# Patient Record
Sex: MALE | Race: WHITE | NOT HISPANIC OR LATINO | URBAN - METROPOLITAN AREA
[De-identification: names, ages, dates, MRNs, and addresses within clinical notes are randomized per-mention and may not be internally consistent; named-entity substitution may affect disease eponyms.]

---

## 2018-10-06 ENCOUNTER — OFFICE VISIT (OUTPATIENT)
Dept: URGENT CARE | Facility: URGENT CARE | Age: 73
End: 2018-10-06
Payer: COMMERCIAL

## 2018-10-06 VITALS
TEMPERATURE: 96.4 F | OXYGEN SATURATION: 94 % | RESPIRATION RATE: 20 BRPM | HEIGHT: 70 IN | SYSTOLIC BLOOD PRESSURE: 130 MMHG | WEIGHT: 181.8 LBS | BODY MASS INDEX: 26.03 KG/M2 | HEART RATE: 66 BPM | DIASTOLIC BLOOD PRESSURE: 68 MMHG

## 2018-10-06 DIAGNOSIS — K04.7 DENTAL INFECTION: Primary | ICD-10-CM

## 2018-10-06 PROCEDURE — 99202 OFFICE O/P NEW SF 15 MIN: CPT | Performed by: PHYSICIAN ASSISTANT

## 2018-10-06 PROCEDURE — 99202 OFFICE O/P NEW SF 15 MIN: CPT | Mod: PO | Performed by: PHYSICIAN ASSISTANT

## 2018-10-06 RX ORDER — IBUPROFEN 200 MG
200 TABLET ORAL EVERY 6 HOURS PRN
COMMUNITY

## 2018-10-06 RX ORDER — CEPHALEXIN 500 MG/1
500 CAPSULE ORAL 3 TIMES DAILY
Qty: 21 CAPSULE | Refills: 0 | Status: SHIPPED | OUTPATIENT
Start: 2018-10-06 | End: 2018-10-13

## 2018-10-06 ASSESSMENT — PAIN SCALES - GENERAL: PAINLEVEL: 7

## 2018-10-06 NOTE — PROGRESS NOTES
"Subjective      HPI    Merlin J Weinhold is a 72 y.o. male who presents for right upper first molar pain.  Started 3-4 days ago.  Patient does have history of having a crack in this tooth.  It has not been tender until recently.  He has been using ibuprofen approximately 1200 mg daily for pain with some relief although the pain seems to be increasing today he had a harder time eating.  No fevers no nausea vomiting.      Review of Systems    As noted in HPI.      Objective   /68 (BP Location: Left arm, Patient Position: Sitting, Cuff Size: Reg)   Pulse 66   Temp (!) 35.8 °C (96.4 °F) (Temporal)   Resp 20   Ht 1.778 m (5' 10\")   Wt 82.5 kg (181 lb 12.8 oz)   SpO2 94%   BMI 26.09 kg/m²     Physical Exam   Constitutional: He appears well-developed and well-nourished. No distress.   HENT:   Head: Normocephalic and atraumatic.   Mouth/Throat: Abnormal dentition:  Multiple fractured and decayed teeth patient does have a partial for the upper but does not have it and at this point in time.  There is crack noted and swelling and erythema of the gumline around the left upper molar- first.   Eyes: Pupils are equal, round, and reactive to light. EOM are normal.   Cardiovascular: Normal rate.    Pulmonary/Chest: Effort normal and breath sounds normal.   Nursing note and vitals reviewed.      No results found for this or any previous visit (from the past 4 hour(s)).          Assessment/Plan   Diagnoses and all orders for this visit:    Dental infection  -     cephalexin (KEFLEX) 500 mg capsule; Take 1 capsule (500 mg total) by mouth 3 (three) times a day for 7 days.        Discussion:   Plan we will have him do Keflex 500 mg 3 times daily times 7 days he does have a dental appointment already for follow-up.  He may do Tylenol 650 mg and ibuprofen 600 mg 3 times daily to 4 times daily ice or heat as needed for pain.       PA Welch  "

## 2022-03-24 ENCOUNTER — MEDICAL CORRESPONDENCE (OUTPATIENT)
Dept: HEALTH INFORMATION MANAGEMENT | Facility: CLINIC | Age: 77
End: 2022-03-24
Payer: COMMERCIAL

## 2022-03-30 ENCOUNTER — TRANSCRIBE ORDERS (OUTPATIENT)
Dept: MULTI SPECIALTY CLINIC | Facility: CLINIC | Age: 77
End: 2022-03-30
Payer: COMMERCIAL

## 2022-03-30 DIAGNOSIS — E46 PROTEIN-CALORIE MALNUTRITION, UNSPECIFIED SEVERITY (H): ICD-10-CM

## 2022-03-30 DIAGNOSIS — L89.152 PRESSURE INJURY OF SACRAL REGION, STAGE 2 (H): ICD-10-CM

## 2022-03-30 DIAGNOSIS — D50.9 IRON DEFICIENCY ANEMIA, UNSPECIFIED IRON DEFICIENCY ANEMIA TYPE: ICD-10-CM

## 2022-03-30 DIAGNOSIS — M25.512 CHRONIC PAIN OF BOTH SHOULDERS: ICD-10-CM

## 2022-03-30 DIAGNOSIS — G89.29 CHRONIC PAIN OF BOTH KNEES: ICD-10-CM

## 2022-03-30 DIAGNOSIS — M25.562 CHRONIC PAIN OF BOTH KNEES: ICD-10-CM

## 2022-03-30 DIAGNOSIS — G89.29 CHRONIC PAIN OF BOTH SHOULDERS: ICD-10-CM

## 2022-03-30 DIAGNOSIS — M25.511 CHRONIC PAIN OF BOTH SHOULDERS: ICD-10-CM

## 2022-03-30 DIAGNOSIS — M79.605 BILATERAL LEG PAIN: ICD-10-CM

## 2022-03-30 DIAGNOSIS — M25.561 CHRONIC PAIN OF BOTH KNEES: ICD-10-CM

## 2022-03-30 DIAGNOSIS — R79.89 ELEVATED FERRITIN: ICD-10-CM

## 2022-03-30 DIAGNOSIS — M79.604 BILATERAL LEG PAIN: ICD-10-CM

## 2022-03-31 ENCOUNTER — TELEPHONE (OUTPATIENT)
Dept: RHEUMATOLOGY | Facility: CLINIC | Age: 77
End: 2022-03-31
Payer: COMMERCIAL

## 2022-03-31 NOTE — TELEPHONE ENCOUNTER
[facility] Call Center    Phone Message    May a detailed message be left on voicemail: no     Reason for Call: Appointment Intake    Referring Provider Name: Cristy Connolly MD from [facility]    Diagnosis and/or Symptoms: Chronic pain of both shoulders [M25.511, G89.29, M25.512]  Protein-calorie malnutrition, unspecified severity (H) [E46]  Chronic pain of both knees [M25.561, M25.562, G89.29]  Pressure injury of sacral region, stage 2 (H) [L89.152]  Iron deficiency anemia, unspecified iron deficiency anemia type [D50.9]  Bilateral leg pain [M79.604, M79.605]  Elevated ferritin [R79.89]    Urgent: 3-5 Days    Pt's spouse states they are looking for a second opinion and was recommended to be seen at the [facility] providers.    No records was received is the referral but writer had ask Pt's spouse to reach back out to the referring provider to resend records and labs to [facility] Fax: [phone] and Rheum Scheduling Fax.    Please review and advise if okay to be seen by [facility] Rheumatologist.      Action Taken: Message routed to:  [facility] ([facility]): Adult Rheumatology

## 2022-03-31 NOTE — TELEPHONE ENCOUNTER
This referral does not meet the criteria for an appointment as this is not a diagnosis that the Adult Rheumatology evaluates/manages/treats per our protocol. We encourage the patient to schedule with a community Rheumatology clinic such as [facility] or other community Rheumatology provider.     Routing to the referral team.    Kimber Adames CMA   3/31/2022 12:38 PM

## 2022-04-04 NOTE — TELEPHONE ENCOUNTER
Call received from Dr. Cristy Connolly from [facility]. She was initially requesting a call back from Dr. Alicia specifically, but otherwise would like a call back from Dr. Iniguez (since that is who pt is currently scheduled with).     She is extremely concerned about this patient needing to wait until September to be seen (when he is currently scheduled for), and wanted to provide some additional information.   She reports that pt has lost #55 pounds since approximately the beginning of January, he has several pressure sores (X 7) that are causing him extreme pain and limiting his ability to either sit or stand. He is extremely fatigued, has no appetite, and is overall declining rapidly.   He also has several abnormal rheumatology-related lab results (Anti SSA, Anti chromatin, Anti DNA antibody, Kappa freelight, IGA, etc).   He previously met with a rheumatologist (3/14/22 at [facility]) who did not feel that the diagnoses of either polymalgia rheumatica or Raynaud's were entirely appropriate, and instead diagnosed him with a connective tissue disorder and started him on hydroxychloroquine.     When pt's chart was initially reviewed for him to be seen at the [facility], he was denied, partly because there were no additional records sent with his referral.   However, all of his lab results and office visit notes (from his PCP, rheumatologist, and oncologist) are all available and visible in Care Everywhere.   Dr. Connolly would like those results taken into consideration, and is hoping this patient can be fit in at a much earlier date. She is concerned that with his #55 pound weight loss in under 4 months, that he may not still be around in September if something is not done.     Routed to RHEUMATOLOGY SUPPORT POOL, [facility] RHEUMATOLOGY ADULT [facility]

## 2022-04-05 NOTE — TELEPHONE ENCOUNTER
Called and spoke with pt's wife regarding Dr. Arreguin's request for pt to be seen on Monday.  They will come on Monday at 7:30 am.      Pavithra Dhillon RN  Rheumatology Clinic

## 2022-04-11 ENCOUNTER — PRE VISIT (OUTPATIENT)
Dept: RHEUMATOLOGY | Facility: CLINIC | Age: 77
End: 2022-04-11
Payer: COMMERCIAL

## 2022-04-11 ENCOUNTER — OFFICE VISIT (OUTPATIENT)
Dept: RHEUMATOLOGY | Facility: CLINIC | Age: 77
End: 2022-04-11
Attending: INTERNAL MEDICINE
Payer: COMMERCIAL

## 2022-04-11 VITALS
DIASTOLIC BLOOD PRESSURE: 64 MMHG | WEIGHT: 130.7 LBS | TEMPERATURE: 97.9 F | HEART RATE: 66 BPM | SYSTOLIC BLOOD PRESSURE: 104 MMHG | RESPIRATION RATE: 16 BRPM | OXYGEN SATURATION: 100 %

## 2022-04-11 DIAGNOSIS — G89.29 CHRONIC PAIN OF BOTH SHOULDERS: ICD-10-CM

## 2022-04-11 DIAGNOSIS — M79.604 BILATERAL LEG PAIN: ICD-10-CM

## 2022-04-11 DIAGNOSIS — Z11.59 ENCOUNTER FOR SCREENING FOR OTHER VIRAL DISEASES: ICD-10-CM

## 2022-04-11 DIAGNOSIS — M25.511 CHRONIC PAIN OF BOTH SHOULDERS: ICD-10-CM

## 2022-04-11 DIAGNOSIS — M25.562 CHRONIC PAIN OF BOTH KNEES: ICD-10-CM

## 2022-04-11 DIAGNOSIS — M25.512 CHRONIC PAIN OF BOTH SHOULDERS: ICD-10-CM

## 2022-04-11 DIAGNOSIS — L89.152 PRESSURE INJURY OF SACRAL REGION, STAGE 2 (H): ICD-10-CM

## 2022-04-11 DIAGNOSIS — M79.605 BILATERAL LEG PAIN: ICD-10-CM

## 2022-04-11 DIAGNOSIS — D50.9 IRON DEFICIENCY ANEMIA, UNSPECIFIED IRON DEFICIENCY ANEMIA TYPE: ICD-10-CM

## 2022-04-11 DIAGNOSIS — G89.29 CHRONIC PAIN OF BOTH KNEES: ICD-10-CM

## 2022-04-11 DIAGNOSIS — M25.561 CHRONIC PAIN OF BOTH KNEES: ICD-10-CM

## 2022-04-11 DIAGNOSIS — E46 PROTEIN-CALORIE MALNUTRITION, UNSPECIFIED SEVERITY (H): ICD-10-CM

## 2022-04-11 DIAGNOSIS — M35.3 PMR (POLYMYALGIA RHEUMATICA) (H): Primary | ICD-10-CM

## 2022-04-11 DIAGNOSIS — R79.89 ELEVATED FERRITIN: ICD-10-CM

## 2022-04-11 PROCEDURE — 99205 OFFICE O/P NEW HI 60 MIN: CPT | Performed by: INTERNAL MEDICINE

## 2022-04-11 PROCEDURE — G0463 HOSPITAL OUTPT CLINIC VISIT: HCPCS

## 2022-04-11 RX ORDER — HYDROXYCHLOROQUINE SULFATE 200 MG/1
200 TABLET, FILM COATED ORAL 2 TIMES DAILY
COMMUNITY
Start: 2022-03-14 | End: 2023-10-05

## 2022-04-11 RX ORDER — HYDROCODONE BITARTRATE AND ACETAMINOPHEN 5; 325 MG/1; MG/1
1 TABLET ORAL EVERY 12 HOURS
COMMUNITY
Start: 2022-03-09

## 2022-04-11 RX ORDER — MULTIVIT WITH MINERALS/LUTEIN
TABLET ORAL DAILY
COMMUNITY

## 2022-04-11 RX ORDER — OMEPRAZOLE 40 MG/1
40 CAPSULE, DELAYED RELEASE ORAL 2 TIMES DAILY
COMMUNITY
Start: 2022-02-21 | End: 2024-01-19

## 2022-04-11 RX ORDER — METHOCARBAMOL 500 MG/1
250-500 TABLET, FILM COATED ORAL EVERY 6 HOURS PRN
COMMUNITY
Start: 2022-03-01 | End: 2024-01-19

## 2022-04-11 RX ORDER — FAMOTIDINE 20 MG/1
20 TABLET, FILM COATED ORAL DAILY
COMMUNITY
Start: 2022-03-14 | End: 2024-01-19

## 2022-04-11 RX ORDER — METHYLPREDNISOLONE 4 MG
2.5 TABLET, DOSE PACK ORAL DAILY
COMMUNITY
Start: 2021-09-10 | End: 2024-01-19

## 2022-04-11 ASSESSMENT — PAIN SCALES - GENERAL: PAINLEVEL: MILD PAIN (3)

## 2022-04-11 NOTE — LETTER
"4/11/2022       RE: Merlin J Weinhold  [address] [address]     Dear Colleague,    Thank you for referring your patient, Merlin J Weinhold, to the [facility]OLOG[facility] [place] [facility]. Please see a copy of my visit note below.      Outpatient Rheumatology Consultation    Name: Merlin Weinhold    MRN [id number]   Today's date: 4/11/2022         Reason for consult: Evaluation and treatment of PMR   Requesting physician: Cristy Connolly MD             Assessment & Plan:   76 year old male who is followed by rheumatologist Dr Smith  for PMR on HCQ and steroids is referred for urgent referral by his PCP for evaluation with the following concerns:  1) weight loss of 55 pounds  2) pressure sores causing him pain  3) Review of his diagnosis of PMR    Overall, I agree that this patient's diagnosis is most consistent with polymyalgia rheumatica with symptoms of bilateral proximal upper and lower extremity pain/stiffness with rapid resolution of symptoms with steroids only to return with discontinuation associated with elevated ESR/CRP. He has no symptoms of GCA. Counseled him about these and what to look out for. His CRP is negative and his ESR is 33. Given his age of 76/ gender and other issues, this is likely either at or close to his baseline. PMR symptoms are currently well controlled. HCQ and steroid plan is in place by Dr Smith.    Reviewing his serologies obtained with his visit today, his GEORGINA is 1:1280 in a homogeneous pattern. SSA, SSB, smith, RNP, dsDNA, C4 are all negative/normal. His C3 is borderline low of which in this setting I am not concerned.  A prior work-up in 2/23/22 showed elevated GEORGINA/chromatin/dsDNA/SSA which I would note was drawn around the time he was admitted to the hospital for severe hyponatremia secondary to a \"cleanse\" prescribed by a naturopath- more on this below. I suspect " that his positive antibodies at that time, which can be seen in the setting of medications etc, and are negative now may have been secondary to his exposure to whatever was in that.    In regards to his weight loss, he has been having severe abdominal pain and not eating as a result. He has been taking 6-8 advil daily (since June) in addition to steroids (starting in August) without PPI, despite the recommendation from rheumatology.  It is unclear why. 10 pounds lost during COVID infection. 10 pounds lost during the above cleanse. And the other 35 pounds over the last 14 months as a result of severe post prandial pain. Had EGD with duodenal ulcer found, though thought not to explain his symptoms. Agree with plan to return to GI for consideration of repeat EGD with push enterography.     In regards to his sores causing pain- I suspect this is driven by his malnutrition secondary to his poor PO intake secondary to his abdominal pain secondary to his high NSAID intake without PPI. I recommend that his GI complaints are addressed so that he can eat/ increase calorie/protein intake and heal. Consider wound consult if you need assistance as well.    Recommendations:  1) Continue HCQ and steroids per Dr Smith for PMR  2) Return to GI as planned for consideration of repeating EGD with push enterography  3) Take PPI  4) Avoid cleanses  5) labs today- results above  6) Return to PCP for follow-up    Happy to see Merlin back in 4-5 months, though agree with all being done by Dr Smith. I think the biggest issue here leading to much of the above is the gastric issue/ severe abdominal pain/ inability to eat due to pain. Needs to start PPI. Needs to return to GI. Needs to return to PCP to ensure these things are done. His PMR is well controlled at this time.    Silvestre Arreguin MD  Rheumatology     I spent a total of 60 minutes on the date of service on chart review (PCP, labs, rheumatology from care everywhere),  "patient in person encounter, , documentation.      Subjective:   Jan/Feb, both shoulders became painful. Could lift his arms above his shoulders. Went to PT for 4 weeks, twice weekly and his ROM inproved, though pain did not resolve. He went back to PT for another 2-3 weeks. And pain still did not resolve. And plan was to continue PT at home and was doing this daily 5x/week. He then had left hip pain as well, and so had injection into left hip then bilateral shoulders as well. Though this only lasted for days to weeks. So he had second injection into right shoulder, which again, not much helpful. Then in June, had hyponatremia and was admitted to [facility]. At the end of august was started on prednisone with taper. Medrol dose pack. Did this ultimately 3 times. He had noticeable improvement with each of these. Then due to this improvement he was started on 8mg of medrol. Had noted small/mild stomach pain starting in the summer of 2021. Had small glass of wine in nov which was some worse. Then in January this started to escalate. In Junuary he had also developed significant hives (previously seen in sept when on fluconazole). He had much worsening of stomach pain during this episode of hives. Small amount of eating or tums \"took the edge off\" of his stomach pain. Appetite was much reduced during this period. Pain was so severe that evening that he got up to take advil and had a fall (hit right shoulder/head). Was seen by PCP/given pepcid. Had CT head without bleed.     In June, was taking 6-8 advil per day. From June to Feb was taking 6-8 advil per day. This was with his steroid that was started in august.      Duodenal Ulcer \"benign appearing, but it is a bit unual in that it is completely circumferential with only 2mm of witdt and mild associated narrowing of the lumen\".     Has lost about 55 pounds from Jan 2021 to today. 10 with COVID. 10 with cleanse with natropath. It was on day 8 of this that " he ended up in the hospital due to hyponatremia.     No HA. No vision change. No scalp tenderness. No jaw claudication symptoms. No dry cough. Has chills. No fevers/ night sweats. No epistaxis/hemoptysis. Has had canker sores on and off for years (seconary to peanuts). No blood in stool. No chest pain/shortness of breath. Abdominal symptoms above. No red/hot/swollen joints. No new rash. Has ulcers from pressure sores. Slowly healing. These are improving slowly. No symptoms consistent with dactylitis. No symptoms consistent with sclerodactyy.     Past Medical History  PVD  Hearing loss  Osteoarthritis in hands    Past Surgical History  Appenix  Cyst removal    Medications  No current outpatient medications on file.     No current facility-administered medications for this visit.     2.5 tabs. Decreasing by half every 2 weeks.   May 10th for liver  Oberto June 14th    Allergies   Not on File    Family History  Strong family history of CA (lung or breast in 4 of 6 of his immediate family)    Social History  Prior smoker, quit 49. No ETOH since nov. No hx of drug use now or in the past.      Objective:   /64 (BP Location: Right arm, Patient Position: Sitting, Cuff Size: Adult Regular)   Pulse 66   Temp 97.9  F (36.6  C) (Oral)   Resp 16   Wt 59.3 kg (130 lb 11.2 oz)   SpO2 100%   Physical exam:  GEN: sitting up unassisted, NAD, wife present with him, looks fatigued  HEENT: No facial rash, sclera clear, no oral or nasal ulcers, no inflammatory nasal bridge/external ear changes, good saliva pool, looks   CV: RRR, no m/r/g, mild hypotension  Pulm: CTAB no crackles wheezing ronchi  Abdomen: soft, non tender even to deep palpation, not distended, no masses  Extremities: Has slow but ultimately full active and passive ROM of bilateral shoulders/elbows, wrists. Can make full fist bilaterally. Knees/ankles/MTPs unremarkable. No synovitis of these joints. No dactylitis. No digital pitting. No nail changes. No  sclerodactyly  Skin: No acute cutaneous changes     Labs:  3/25/2022  Serum electrophoresis no paraprotein identified    3/22/2022  Ferritin 563  Creatinine 0.62  AST 24  ALT 20  CRP 4  ESR 21    3/12/2022  WBC 4.3  Hemoglobin 9.5  Platelet 264    2/23/2022  Double-stranded DNA 38  SSA 1.3  Antichromatin antibody greater than 8  RNP negative  M[place]onado negative  SCL 70 -  SSB negative  Billie 1 -  Anticentromere negative  Hep C antibody negative    8/25/2021  Lyme IgG and IgM negative  CK normal at 40  CCP antibody negative    Imaging:  EXAM: CT CHEST WITH CONTRAST; CT ABDOMEN AND PELVIS WITH   CONTRAST     CLINICAL INFORMATION: Weight loss, abdominal pain     TECHNICAL INFORMATION:  The patient was given 3 cups of   water containing a total of 50 mL Omnipaque 300 to drink   prior to the exam.  After IV injection of 100 mL of   Omnipaque 300, axial sections were obtained from the   thoracic inlet through the symphysis pubis.  Reformations   were obtained in the coronal and sagittal planes.  No   immediate complications were seen.       COMPARISON: Ultrasound 8/26/2021     INTERPRETATION:     Chest:     The lungs are clear.  No suspicious pulmonary nodules or   masses.     Heart size is normal; aortic and coronary atherosclerosis   noted.  No pericardial or pleural effusion.     No intrathoracic lymphadenopathy by size criteria.     Abdomen:     The liver, bile ducts, pancreas, spleen, adrenal glands, and   kidneys are within normal limits.  There is cholelithiasis   without evidence of cholecystitis.  Visualized intestines   are unremarkable.     No abdominopelvic lymphadenopathy by size criteria.   Negative for ascites.  There is atherosclerosis of the aorta   and its branches without aneurysm.     Pelvis:     The prostate measures 4.1 x 5.0 cm.     Urinary bladder is partially collapsed but grossly normal.     Musculoskeletal:     No acute or suspicious bony abnormality.     CONCLUSION:     1. No CT findings to explain  the patient's symptoms.   2. Prostatic enlargement.

## 2022-04-11 NOTE — TELEPHONE ENCOUNTER
NOTES Status Details   OFFICE NOTE from referring provider Received 03.24.2022 Cristy Connolly MD   MEDICATION LIST Care Everywhere    LABS (Any and all labs)      Care Everywhere

## 2022-04-11 NOTE — LETTER
"4/11/2022      RE: Merlin J Weinhold  [address] [facility] [address]         Outpatient Rheumatology Consultation    Name: Merlin Weinhold    MRN [id number]   Today's date: 4/11/2022         Reason for consult: Evaluation and treatment of PMR   Requesting physician: Cristy Connolly MD             Assessment & Plan:   76 year old male who is followed by rheumatologist Dr Smith  for PMR on HCQ and steroids is referred for urgent referral by his PCP for evaluation with the following concerns:  1) weight loss of 55 pounds  2) pressure sores causing him pain  3) Review of his diagnosis of PMR    Overall, I agree that this patient's diagnosis is most consistent with polymyalgia rheumatica with symptoms of bilateral proximal upper and lower extremity pain/stiffness with rapid resolution of symptoms with steroids only to return with discontinuation associated with elevated ESR/CRP. He has no symptoms of GCA. Counseled him about these and what to look out for. His CRP is negative and his ESR is 33. Given his age of 76/ gender and other issues, this is likely either at or close to his baseline. PMR symptoms are currently well controlled. HCQ and steroid plan is in place by Dr Smith.    Reviewing his serologies obtained with his visit today, his GEORGINA is 1:1280 in a homogeneous pattern. SSA, SSB, smith, RNP, dsDNA, C4 are all negative/normal. His C3 is borderline low of which in this setting I am not concerned.  A prior work-up in 2/23/22 showed elevated GEORGINA/chromatin/dsDNA/SSA which I would note was drawn around the time he was admitted to the hospital for severe hyponatremia secondary to a \"cleanse\" prescribed by a naturopath- more on this below. I suspect that his positive antibodies at that time, which can be seen in the setting of medications etc, and are negative now may have been secondary to his exposure to whatever was in that.    In regards to his weight loss, he has been having " severe abdominal pain and not eating as a result. He has been taking 6-8 advil daily (since June) in addition to steroids (starting in August) without PPI, despite the recommendation from rheumatology.  It is unclear why. 10 pounds lost during COVID infection. 10 pounds lost during the above cleanse. And the other 35 pounds over the last 14 months as a result of severe post prandial pain. Had EGD with duodenal ulcer found, though thought not to explain his symptoms. Agree with plan to return to GI for consideration of repeat EGD with push enterography.     In regards to his sores causing pain- I suspect this is driven by his malnutrition secondary to his poor PO intake secondary to his abdominal pain secondary to his high NSAID intake without PPI. I recommend that his GI complaints are addressed so that he can eat/ increase calorie/protein intake and heal. Consider wound consult if you need assistance as well.    Recommendations:  1) Continue HCQ and steroids per Dr Smith for PMR  2) Return to GI as planned for consideration of repeating EGD with push enterography  3) Take PPI  4) Avoid cleanses  5) labs today- results above  6) Return to PCP for follow-up    Happy to see Merlin back in 4-5 months, though agree with all being done by Dr Smith. I think the biggest issue here leading to much of the above is the gastric issue/ severe abdominal pain/ inability to eat due to pain. Needs to start PPI. Needs to return to GI. Needs to return to PCP to ensure these things are done. His PMR is well controlled at this time.    Silvestre Arreguin MD  Rheumatology     I spent a total of 60 minutes on the date of service on chart review (PCP, labs, rheumatology from care everywhere), patient in person encounter, , documentation.      Subjective:   Jan/Feb, both shoulders became painful. Could lift his arms above his shoulders. Went to PT for 4 weeks, twice weekly and his ROM inproved, though pain did not  "resolve. He went back to PT for another 2-3 weeks. And pain still did not resolve. And plan was to continue PT at home and was doing this daily 5x/week. He then had left hip pain as well, and so had injection into left hip then bilateral shoulders as well. Though this only lasted for days to weeks. So he had second injection into right shoulder, which again, not much helpful. Then in June, had hyponatremia and was admitted to [facility]. At the end of august was started on prednisone with taper. Medrol dose pack. Did this ultimately 3 times. He had noticeable improvement with each of these. Then due to this improvement he was started on 8mg of medrol. Had noted small/mild stomach pain starting in the summer of 2021. Had small glass of wine in nov which was some worse. Then in January this started to escalate. In Junuary he had also developed significant hives (previously seen in sept when on fluconazole). He had much worsening of stomach pain during this episode of hives. Small amount of eating or tums \"took the edge off\" of his stomach pain. Appetite was much reduced during this period. Pain was so severe that evening that he got up to take advil and had a fall (hit right shoulder/head). Was seen by PCP/given pepcid. Had CT head without bleed.     In June, was taking 6-8 advil per day. From June to Feb was taking 6-8 advil per day. This was with his steroid that was started in august.      Duodenal Ulcer \"benign appearing, but it is a bit unual in that it is completely circumferential with only 2mm of witdt and mild associated narrowing of the lumen\".     Has lost about 55 pounds from Jan 2021 to today. 10 with COVID. 10 with cleanse with natropath. It was on day 8 of this that he ended up in the hospital due to hyponatremia.     No HA. No vision change. No scalp tenderness. No jaw claudication symptoms. No dry cough. Has chills. No fevers/ night sweats. No epistaxis/hemoptysis. Has had canker sores on and off " for years (seconary to peanuts). No blood in stool. No chest pain/shortness of breath. Abdominal symptoms above. No red/hot/swollen joints. No new rash. Has ulcers from pressure sores. Slowly healing. These are improving slowly. No symptoms consistent with dactylitis. No symptoms consistent with sclerodactyy.     Past Medical History  PVD  Hearing loss  Osteoarthritis in hands    Past Surgical History  Appenix  Cyst removal    Medications  No current outpatient medications on file.     No current facility-administered medications for this visit.     2.5 tabs. Decreasing by half every 2 weeks.   May 10th for liver  Oberto June 14th    Allergies   Not on File    Family History  Strong family history of CA (lung or breast in 4 of 6 of his immediate family)    Social History  Prior smoker, quit 49. No ETOH since nov. No hx of drug use now or in the past.      Objective:   /64 (BP Location: Right arm, Patient Position: Sitting, Cuff Size: Adult Regular)   Pulse 66   Temp 97.9  F (36.6  C) (Oral)   Resp 16   Wt 59.3 kg (130 lb 11.2 oz)   SpO2 100%   Physical exam:  GEN: sitting up unassisted, NAD, wife present with him, looks fatigued  HEENT: No facial rash, sclera clear, no oral or nasal ulcers, no inflammatory nasal bridge/external ear changes, good saliva pool, looks   CV: RRR, no m/r/g, mild hypotension  Pulm: CTAB no crackles wheezing ronchi  Abdomen: soft, non tender even to deep palpation, not distended, no masses  Extremities: Has slow but ultimately full active and passive ROM of bilateral shoulders/elbows, wrists. Can make full fist bilaterally. Knees/ankles/MTPs unremarkable. No synovitis of these joints. No dactylitis. No digital pitting. No nail changes. No sclerodactyly  Skin: No acute cutaneous changes     Labs:  3/25/2022  Serum electrophoresis no paraprotein identified    3/22/2022  Ferritin 563  Creatinine 0.62  AST 24  ALT 20  CRP 4  ESR 21    3/12/2022  WBC 4.3  Hemoglobin 9.5  Platelet  264    2/23/2022  Double-stranded DNA 38  SSA 1.3  Antichromatin antibody greater than 8  RNP negative  M[place]onado negative  SCL 70 -  SSB negative  Billie 1 -  Anticentromere negative  Hep C antibody negative    8/25/2021  Lyme IgG and IgM negative  CK normal at 40  CCP antibody negative    Imaging:  EXAM: CT CHEST WITH CONTRAST; CT ABDOMEN AND PELVIS WITH   CONTRAST     CLINICAL INFORMATION: Weight loss, abdominal pain     TECHNICAL INFORMATION:  The patient was given 3 cups of   water containing a total of 50 mL Omnipaque 300 to drink   prior to the exam.  After IV injection of 100 mL of   Omnipaque 300, axial sections were obtained from the   thoracic inlet through the symphysis pubis.  Reformations   were obtained in the coronal and sagittal planes.  No   immediate complications were seen.       COMPARISON: Ultrasound 8/26/2021     INTERPRETATION:     Chest:     The lungs are clear.  No suspicious pulmonary nodules or   masses.     Heart size is normal; aortic and coronary atherosclerosis   noted.  No pericardial or pleural effusion.     No intrathoracic lymphadenopathy by size criteria.     Abdomen:     The liver, bile ducts, pancreas, spleen, adrenal glands, and   kidneys are within normal limits.  There is cholelithiasis   without evidence of cholecystitis.  Visualized intestines   are unremarkable.     No abdominopelvic lymphadenopathy by size criteria.   Negative for ascites.  There is atherosclerosis of the aorta   and its branches without aneurysm.     Pelvis:     The prostate measures 4.1 x 5.0 cm.     Urinary bladder is partially collapsed but grossly normal.     Musculoskeletal:     No acute or suspicious bony abnormality.     CONCLUSION:     1. No CT findings to explain the patient's symptoms.   2. Prostatic enlargement.            Silvestre Arreguin MD

## 2022-04-11 NOTE — PROGRESS NOTES
"  Outpatient Rheumatology Consultation    Name: Merlin Weinhold    MRN [id number]   Today's date: 4/11/2022         Reason for consult: Evaluation and treatment of PMR   Requesting physician: Cristy Connolly MD             Assessment & Plan:   76 year old male who is followed by rheumatologist Dr Smith  for PMR on HCQ and steroids is referred for urgent referral by his PCP for evaluation with the following concerns:  1) weight loss of 55 pounds  2) pressure sores causing him pain  3) Review of his diagnosis of PMR    Overall, I agree that this patient's diagnosis is most consistent with polymyalgia rheumatica with symptoms of bilateral proximal upper and lower extremity pain/stiffness with rapid resolution of symptoms with steroids only to return with discontinuation associated with elevated ESR/CRP. He has no symptoms of GCA. Counseled him about these and what to look out for. His CRP is negative and his ESR is 33. Given his age of 76/ gender and other issues, this is likely either at or close to his baseline. PMR symptoms are currently well controlled. HCQ and steroid plan is in place by Dr Smith.    Reviewing his serologies obtained with his visit today, his GEORGINA is 1:1280 in a homogeneous pattern. SSA, SSB, smith, RNP, dsDNA, C4 are all negative/normal. His C3 is borderline low of which in this setting I am not concerned.  A prior work-up in 2/23/22 showed elevated GEORIGNA/chromatin/dsDNA/SSA which I would note was drawn around the time he was admitted to the hospital for severe hyponatremia secondary to a \"cleanse\" prescribed by a naturopath- more on this below. I suspect that his positive antibodies at that time, which can be seen in the setting of medications etc, and are negative now may have been secondary to his exposure to whatever was in that.    In regards to his weight loss, he has been having severe abdominal pain and not eating as a result. He has been taking 6-8 advil daily (since June) " in addition to steroids (starting in August) without PPI, despite the recommendation from rheumatology.  It is unclear why. 10 pounds lost during COVID infection. 10 pounds lost during the above cleanse. And the other 35 pounds over the last 14 months as a result of severe post prandial pain. Had EGD with duodenal ulcer found, though thought not to explain his symptoms. Agree with plan to return to GI for consideration of repeat EGD with push enterography.     In regards to his sores causing pain- I suspect this is driven by his malnutrition secondary to his poor PO intake secondary to his abdominal pain secondary to his high NSAID intake without PPI. I recommend that his GI complaints are addressed so that he can eat/ increase calorie/protein intake and heal. Consider wound consult if you need assistance as well.    Recommendations:  1) Continue HCQ and steroids per Dr Smith for PMR  2) Return to GI as planned for consideration of repeating EGD with push enterography  3) Take PPI  4) Avoid cleanses  5) labs today- results above  6) Return to PCP for follow-up    Happy to see Merlin back in 4-5 months, though agree with all being done by Dr Smith. I think the biggest issue here leading to much of the above is the gastric issue/ severe abdominal pain/ inability to eat due to pain. Needs to start PPI. Needs to return to GI. Needs to return to PCP to ensure these things are done. His PMR is well controlled at this time.    Silvestre Arreguin MD  Rheumatology     I spent a total of 60 minutes on the date of service on chart review (PCP, labs, rheumatology from care everywhere), patient in person encounter, , documentation.      Subjective:   Jan/Feb, both shoulders became painful. Could lift his arms above his shoulders. Went to PT for 4 weeks, twice weekly and his ROM inproved, though pain did not resolve. He went back to PT for another 2-3 weeks. And pain still did not resolve. And plan was to  "continue PT at home and was doing this daily 5x/week. He then had left hip pain as well, and so had injection into left hip then bilateral shoulders as well. Though this only lasted for days to weeks. So he had second injection into right shoulder, which again, not much helpful. Then in June, had hyponatremia and was admitted to [facility]. At the end of august was started on prednisone with taper. Medrol dose pack. Did this ultimately 3 times. He had noticeable improvement with each of these. Then due to this improvement he was started on 8mg of medrol. Had noted small/mild stomach pain starting in the summer of 2021. Had small glass of wine in nov which was some worse. Then in January this started to escalate. In Junuary he had also developed significant hives (previously seen in sept when on fluconazole). He had much worsening of stomach pain during this episode of hives. Small amount of eating or tums \"took the edge off\" of his stomach pain. Appetite was much reduced during this period. Pain was so severe that evening that he got up to take advil and had a fall (hit right shoulder/head). Was seen by PCP/given pepcid. Had CT head without bleed.     In June, was taking 6-8 advil per day. From June to Feb was taking 6-8 advil per day. This was with his steroid that was started in august.      Duodenal Ulcer \"benign appearing, but it is a bit unual in that it is completely circumferential with only 2mm of witdt and mild associated narrowing of the lumen\".     Has lost about 55 pounds from Jan 2021 to today. 10 with COVID. 10 with cleanse with natropath. It was on day 8 of this that he ended up in the hospital due to hyponatremia.     No HA. No vision change. No scalp tenderness. No jaw claudication symptoms. No dry cough. Has chills. No fevers/ night sweats. No epistaxis/hemoptysis. Has had canker sores on and off for years (seconary to peanuts). No blood in stool. No chest pain/shortness of breath. Abdominal " symptoms above. No red/hot/swollen joints. No new rash. Has ulcers from pressure sores. Slowly healing. These are improving slowly. No symptoms consistent with dactylitis. No symptoms consistent with sclerodactyy.     Past Medical History  PVD  Hearing loss  Osteoarthritis in hands    Past Surgical History  Appenix  Cyst removal    Medications  No current outpatient medications on file.     No current facility-administered medications for this visit.     2.5 tabs. Decreasing by half every 2 weeks.   May 10th for liver  Oberto June 14th    Allergies   Not on File    Family History  Strong family history of CA (lung or breast in 4 of 6 of his immediate family)    Social History  Prior smoker, quit 49. No ETOH since nov. No hx of drug use now or in the past.      Objective:   /64 (BP Location: Right arm, Patient Position: Sitting, Cuff Size: Adult Regular)   Pulse 66   Temp 97.9  F (36.6  C) (Oral)   Resp 16   Wt 59.3 kg (130 lb 11.2 oz)   SpO2 100%   Physical exam:  GEN: sitting up unassisted, NAD, wife present with him, looks fatigued  HEENT: No facial rash, sclera clear, no oral or nasal ulcers, no inflammatory nasal bridge/external ear changes, good saliva pool, looks   CV: RRR, no m/r/g, mild hypotension  Pulm: CTAB no crackles wheezing ronchi  Abdomen: soft, non tender even to deep palpation, not distended, no masses  Extremities: Has slow but ultimately full active and passive ROM of bilateral shoulders/elbows, wrists. Can make full fist bilaterally. Knees/ankles/MTPs unremarkable. No synovitis of these joints. No dactylitis. No digital pitting. No nail changes. No sclerodactyly  Skin: No acute cutaneous changes     Labs:  3/25/2022  Serum electrophoresis no paraprotein identified    3/22/2022  Ferritin 563  Creatinine 0.62  AST 24  ALT 20  CRP 4  ESR 21    3/12/2022  WBC 4.3  Hemoglobin 9.5  Platelet 264    2/23/2022  Double-stranded DNA 38  SSA 1.3  Antichromatin antibody greater than 8  RNP  negative  M[place]onado negative  SCL 70 -  SSB negative  Billie 1 -  Anticentromere negative  Hep C antibody negative    8/25/2021  Lyme IgG and IgM negative  CK normal at 40  CCP antibody negative    Imaging:  EXAM: CT CHEST WITH CONTRAST; CT ABDOMEN AND PELVIS WITH   CONTRAST     CLINICAL INFORMATION: Weight loss, abdominal pain     TECHNICAL INFORMATION:  The patient was given 3 cups of   water containing a total of 50 mL Omnipaque 300 to drink   prior to the exam.  After IV injection of 100 mL of   Omnipaque 300, axial sections were obtained from the   thoracic inlet through the symphysis pubis.  Reformations   were obtained in the coronal and sagittal planes.  No   immediate complications were seen.       COMPARISON: Ultrasound 8/26/2021     INTERPRETATION:     Chest:     The lungs are clear.  No suspicious pulmonary nodules or   masses.     Heart size is normal; aortic and coronary atherosclerosis   noted.  No pericardial or pleural effusion.     No intrathoracic lymphadenopathy by size criteria.     Abdomen:     The liver, bile ducts, pancreas, spleen, adrenal glands, and   kidneys are within normal limits.  There is cholelithiasis   without evidence of cholecystitis.  Visualized intestines   are unremarkable.     No abdominopelvic lymphadenopathy by size criteria.   Negative for ascites.  There is atherosclerosis of the aorta   and its branches without aneurysm.     Pelvis:     The prostate measures 4.1 x 5.0 cm.     Urinary bladder is partially collapsed but grossly normal.     Musculoskeletal:     No acute or suspicious bony abnormality.     CONCLUSION:     1. No CT findings to explain the patient's symptoms.   2. Prostatic enlargement.

## 2022-04-11 NOTE — NURSING NOTE
Chief Complaint   Patient presents with     Consult     Chronic pain of both shoulders and knees     Possible raynaud's    Vital signs:  Temp: 97.9  F (36.6  C) Temp src: Oral BP: 104/64 Pulse: 66   Resp: 16 SpO2: 100 %       Weight: 59.3 kg (130 lb 11.2 oz)  There is no height or weight on file to calculate BMI.        Mary Lucas, [facility]  4/11/2022 7:26 AM

## 2022-04-12 ENCOUNTER — LAB (OUTPATIENT)
Dept: LAB | Facility: CLINIC | Age: 77
End: 2022-04-12
Payer: COMMERCIAL

## 2022-04-12 DIAGNOSIS — Z11.59 ENCOUNTER FOR SCREENING FOR OTHER VIRAL DISEASES: ICD-10-CM

## 2022-04-12 DIAGNOSIS — M35.3 PMR (POLYMYALGIA RHEUMATICA) (H): ICD-10-CM

## 2022-04-12 LAB
ALBUMIN UR-MCNC: NEGATIVE MG/DL
APPEARANCE UR: CLEAR
BILIRUB UR QL STRIP: NEGATIVE
COLOR UR AUTO: YELLOW
CRP SERPL-MCNC: <2.9 MG/L (ref 0–8)
ERYTHROCYTE [SEDIMENTATION RATE] IN BLOOD BY WESTERGREN METHOD: 33 MM/HR (ref 0–20)
GLUCOSE UR STRIP-MCNC: NEGATIVE MG/DL
HGB UR QL STRIP: NEGATIVE
HYALINE CASTS: 9 /LPF
KETONES UR STRIP-MCNC: NEGATIVE MG/DL
LEUKOCYTE ESTERASE UR QL STRIP: NEGATIVE
MUCOUS THREADS #/AREA URNS LPF: PRESENT /LPF
NITRATE UR QL: NEGATIVE
PH UR STRIP: 5 [PH] (ref 5–7)
RBC URINE: 1 /HPF
SP GR UR STRIP: 1.02 (ref 1–1.03)
UROBILINOGEN UR STRIP-MCNC: NORMAL MG/DL
WBC URINE: 1 /HPF

## 2022-04-12 PROCEDURE — 86039 ANTINUCLEAR ANTIBODIES (ANA): CPT | Mod: 90 | Performed by: PATHOLOGY

## 2022-04-12 PROCEDURE — 86235 NUCLEAR ANTIGEN ANTIBODY: CPT | Mod: 90 | Performed by: PATHOLOGY

## 2022-04-12 PROCEDURE — 86140 C-REACTIVE PROTEIN: CPT | Performed by: PATHOLOGY

## 2022-04-12 PROCEDURE — 86704 HEP B CORE ANTIBODY TOTAL: CPT | Mod: 90 | Performed by: PATHOLOGY

## 2022-04-12 PROCEDURE — 81001 URINALYSIS AUTO W/SCOPE: CPT | Performed by: PATHOLOGY

## 2022-04-12 PROCEDURE — 87389 HIV-1 AG W/HIV-1&-2 AB AG IA: CPT | Mod: 90 | Performed by: PATHOLOGY

## 2022-04-12 PROCEDURE — 86706 HEP B SURFACE ANTIBODY: CPT | Mod: 90 | Performed by: PATHOLOGY

## 2022-04-12 PROCEDURE — 85652 RBC SED RATE AUTOMATED: CPT | Performed by: PATHOLOGY

## 2022-04-12 PROCEDURE — 86160 COMPLEMENT ANTIGEN: CPT | Mod: 90 | Performed by: PATHOLOGY

## 2022-04-12 PROCEDURE — 86038 ANTINUCLEAR ANTIBODIES: CPT | Mod: 90 | Performed by: PATHOLOGY

## 2022-04-12 PROCEDURE — 36415 COLL VENOUS BLD VENIPUNCTURE: CPT | Performed by: PATHOLOGY

## 2022-04-12 PROCEDURE — 86481 TB AG RESPONSE T-CELL SUSP: CPT | Mod: 90 | Performed by: PATHOLOGY

## 2022-04-12 PROCEDURE — 86225 DNA ANTIBODY NATIVE: CPT | Mod: 90 | Performed by: PATHOLOGY

## 2022-04-12 PROCEDURE — 87340 HEPATITIS B SURFACE AG IA: CPT | Mod: 90 | Performed by: PATHOLOGY

## 2022-04-12 PROCEDURE — 99000 SPECIMEN HANDLING OFFICE-LAB: CPT | Performed by: PATHOLOGY

## 2022-04-13 LAB
C3 SERPL-MCNC: 77 MG/DL (ref 81–157)
C4 SERPL-MCNC: 19 MG/DL (ref 13–39)
DSDNA AB SER-ACNC: 9.7 IU/ML
ENA SM IGG SER IA-ACNC: 1.6 U/ML
ENA SM IGG SER IA-ACNC: NEGATIVE
ENA SS-A AB SER IA-ACNC: 0.5 U/ML
ENA SS-A AB SER IA-ACNC: NEGATIVE
ENA SS-B IGG SER IA-ACNC: <0.6 U/ML
ENA SS-B IGG SER IA-ACNC: NEGATIVE
GAMMA INTERFERON BACKGROUND BLD IA-ACNC: 0.03 IU/ML
HBV CORE AB SERPL QL IA: REACTIVE
HBV SURFACE AB SERPL IA-ACNC: 36.87 M[IU]/ML
HBV SURFACE AG SERPL QL IA: NONREACTIVE
HIV 1+2 AB+HIV1 P24 AG SERPL QL IA: NONREACTIVE
M TB IFN-G BLD-IMP: NEGATIVE
M TB IFN-G CD4+ BCKGRND COR BLD-ACNC: 1.19 IU/ML
MITOGEN IGNF BCKGRD COR BLD-ACNC: -0.01 IU/ML
MITOGEN IGNF BCKGRD COR BLD-ACNC: -0.01 IU/ML
QUANTIFERON MITOGEN: 1.22 IU/ML
QUANTIFERON NIL TUBE: 0.03 IU/ML
QUANTIFERON TB1 TUBE: 0.02 IU/ML
QUANTIFERON TB2 TUBE: 0.02
U1 SNRNP IGG SER IA-ACNC: 1.2 U/ML
U1 SNRNP IGG SER IA-ACNC: NEGATIVE

## 2022-04-14 LAB
ANA PAT SER IF-IMP: ABNORMAL
ANA SER QL IF: POSITIVE
ANA TITR SER IF: ABNORMAL {TITER}

## 2022-08-11 ENCOUNTER — OFFICE VISIT (OUTPATIENT)
Dept: RHEUMATOLOGY | Facility: CLINIC | Age: 77
End: 2022-08-11
Attending: INTERNAL MEDICINE
Payer: COMMERCIAL

## 2022-08-11 ENCOUNTER — LAB (OUTPATIENT)
Dept: LAB | Facility: CLINIC | Age: 77
End: 2022-08-11
Attending: INTERNAL MEDICINE
Payer: COMMERCIAL

## 2022-08-11 VITALS
DIASTOLIC BLOOD PRESSURE: 59 MMHG | HEART RATE: 63 BPM | BODY MASS INDEX: 23.06 KG/M2 | SYSTOLIC BLOOD PRESSURE: 101 MMHG | WEIGHT: 146.9 LBS | HEIGHT: 67 IN | OXYGEN SATURATION: 93 %

## 2022-08-11 DIAGNOSIS — Z79.899 ENCOUNTER FOR LONG-TERM (CURRENT) USE OF HIGH-RISK MEDICATION: ICD-10-CM

## 2022-08-11 DIAGNOSIS — M35.3 PMR (POLYMYALGIA RHEUMATICA) (H): ICD-10-CM

## 2022-08-11 DIAGNOSIS — Z79.52 LONG TERM (CURRENT) USE OF SYSTEMIC STEROIDS: ICD-10-CM

## 2022-08-11 DIAGNOSIS — M35.3 PMR (POLYMYALGIA RHEUMATICA) (H): Primary | ICD-10-CM

## 2022-08-11 LAB
ALBUMIN SERPL-MCNC: 3.4 G/DL (ref 3.4–5)
ALP SERPL-CCNC: 65 U/L (ref 40–150)
ALT SERPL W P-5'-P-CCNC: 20 U/L (ref 0–70)
ANION GAP SERPL CALCULATED.3IONS-SCNC: 6 MMOL/L (ref 3–14)
AST SERPL W P-5'-P-CCNC: 22 U/L (ref 0–45)
BASOPHILS # BLD AUTO: 0 10E3/UL (ref 0–0.2)
BASOPHILS NFR BLD AUTO: 1 %
BILIRUB SERPL-MCNC: 0.4 MG/DL (ref 0.2–1.3)
BUN SERPL-MCNC: 14 MG/DL (ref 7–30)
CALCIUM SERPL-MCNC: 8.7 MG/DL (ref 8.5–10.1)
CHLORIDE BLD-SCNC: 101 MMOL/L (ref 94–109)
CO2 SERPL-SCNC: 31 MMOL/L (ref 20–32)
CREAT SERPL-MCNC: 0.95 MG/DL (ref 0.66–1.25)
CRP SERPL-MCNC: <2.9 MG/L (ref 0–8)
EOSINOPHIL # BLD AUTO: 0.2 10E3/UL (ref 0–0.7)
EOSINOPHIL NFR BLD AUTO: 4 %
ERYTHROCYTE [DISTWIDTH] IN BLOOD BY AUTOMATED COUNT: 12.5 % (ref 10–15)
ERYTHROCYTE [SEDIMENTATION RATE] IN BLOOD BY WESTERGREN METHOD: 19 MM/HR (ref 0–20)
GFR SERPL CREATININE-BSD FRML MDRD: 83 ML/MIN/1.73M2
GLUCOSE BLD-MCNC: 78 MG/DL (ref 70–99)
HCT VFR BLD AUTO: 35.5 % (ref 40–53)
HGB BLD-MCNC: 11.9 G/DL (ref 13.3–17.7)
IMM GRANULOCYTES # BLD: 0 10E3/UL
IMM GRANULOCYTES NFR BLD: 0 %
LYMPHOCYTES # BLD AUTO: 0.8 10E3/UL (ref 0.8–5.3)
LYMPHOCYTES NFR BLD AUTO: 14 %
MCH RBC QN AUTO: 30.5 PG (ref 26.5–33)
MCHC RBC AUTO-ENTMCNC: 33.5 G/DL (ref 31.5–36.5)
MCV RBC AUTO: 91 FL (ref 78–100)
MONOCYTES # BLD AUTO: 0.6 10E3/UL (ref 0–1.3)
MONOCYTES NFR BLD AUTO: 11 %
NEUTROPHILS # BLD AUTO: 4 10E3/UL (ref 1.6–8.3)
NEUTROPHILS NFR BLD AUTO: 70 %
NRBC # BLD AUTO: 0 10E3/UL
NRBC BLD AUTO-RTO: 0 /100
PLATELET # BLD AUTO: 220 10E3/UL (ref 150–450)
POTASSIUM BLD-SCNC: 3.9 MMOL/L (ref 3.4–5.3)
PROT SERPL-MCNC: 6.8 G/DL (ref 6.8–8.8)
RBC # BLD AUTO: 3.9 10E6/UL (ref 4.4–5.9)
SODIUM SERPL-SCNC: 138 MMOL/L (ref 133–144)
WBC # BLD AUTO: 5.6 10E3/UL (ref 4–11)

## 2022-08-11 PROCEDURE — 36415 COLL VENOUS BLD VENIPUNCTURE: CPT | Performed by: PATHOLOGY

## 2022-08-11 PROCEDURE — 99214 OFFICE O/P EST MOD 30 MIN: CPT | Performed by: INTERNAL MEDICINE

## 2022-08-11 PROCEDURE — 85652 RBC SED RATE AUTOMATED: CPT | Performed by: PATHOLOGY

## 2022-08-11 PROCEDURE — 80053 COMPREHEN METABOLIC PANEL: CPT | Performed by: PATHOLOGY

## 2022-08-11 PROCEDURE — 86140 C-REACTIVE PROTEIN: CPT | Performed by: PATHOLOGY

## 2022-08-11 PROCEDURE — 85025 COMPLETE CBC W/AUTO DIFF WBC: CPT | Performed by: PATHOLOGY

## 2022-08-11 ASSESSMENT — PAIN SCALES - GENERAL: PAINLEVEL: NO PAIN (0)

## 2022-08-11 NOTE — LETTER
Date:August 30, 2022      Patient was self referred, no letter generated. Do not send.        [facility] Health Information

## 2022-08-11 NOTE — PROGRESS NOTES
"  Outpatient Rheumatology follow-up    Name: Merlin Weinhold    MRN [id number]   Today's date: 8/11/2022         Reason for follow-up: PMR   Primary care physician: Cristy Connolly MD             Assessment & Plan:   76 year old male who co-managed by rheumatologist Dr Smith  for PMR on HCQ and steroids.    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  This is supported by his labs today which show normal ESR and CRP.  His upper back pain which comes on later in the afternoon does not sound inflammatory nor consistent with PMR. HCQ and steroid plan is in place by Dr Smith.  -Continue HCQ 200mg BID  -Continue methylprednisolone 4mg once daily with taper plan outlined by Dr Smith  -Labs today  -Follow-up in 6 months     #Positive GEORGINA: Reviewing his serologies obtained with his visit today, his GEORGINA is 1:1280 in a homogeneous pattern. SSA, SSB, smith, RNP, dsDNA, C4 are all negative/normal. His C3 is borderline low of which in this setting I am not concerned.  A prior work-up in 2/23/22 showed elevated GEORGINA/chromatin/dsDNA/SSA which I would note was drawn around the time he was admitted to the hospital for severe hyponatremia secondary to a \"cleanse\" prescribed by a naturopath- more on this below. I suspect that his positive antibodies at that time, which can be seen in the setting of medications/supplements etc, and are negative now may have been secondary to his exposure to whatever was in that.    High risk medication use: Hydroxychloroquine  -Risks and benefits of HCQ discussed again today to include rash (including severe rash/SJS/TEN), HA, GI upset, hepatoxicity, retinal toxicity, need for yearly screening OCT exam, need for CBC, CMP, ESR, CRP for routine screening drug toxicity labs among others. Patient is agreeable to continue.  -We do not have records for a baseline screening OCT exam. While prescribed by Dr" Luis, will follow-up with him on this at his next visit  -Routine screening drug toxicity labs obtained today to include CBC, CMP which did not show any evidence of drug toxicity.  We will continue routine drug toxicity screening labs every 6 months    Silvestre Arreguin MD  Rheumatology        Subjective:   Interval history 8/11/2022   has pain in the upper back in the afternoons, between 3-4pm. Lasts for the rest of the day. Takes vicodin in the afternoon another 8pm. In the AM it is gone. His current dose of steroids is 1 tablet of methylprednisolone 4mg each morning. Has been on this dose for about 10 weeks. Shortly after decreasing to that dose, he noticed that his shoulder symptoms were some worse.  Though not by much. No side effects from HCQ. No GI complaints any longer. Up about 16 pounds. No GCA symptoms. Occasional hip discomfort after a short walk. Able to get up out of a chair without any difficulty. Shoulder ROM is much improved. Overall he is functionally improved from his PMR treatment. He and his wife are pleased with him improvement during this time. No interval infections. no fevers chills night sweats.  No new rashes.  14 point review of systems collected and otherwise negative.    HPI from initial consultation 4/11/2022 Jan/Feb, both shoulders became painful. Could lift his arms above his shoulders. Went to PT for 4 weeks, twice weekly and his ROM inproved, though pain did not resolve. He went back to PT for another 2-3 weeks. And pain still did not resolve. And plan was to continue PT at home and was doing this daily 5x/week. He then had left hip pain as well, and so had injection into left hip then bilateral shoulders as well. Though this only lasted for days to weeks. So he had second injection into right shoulder, which again, not much helpful. Then in June, had hyponatremia and was admitted to [facility]. At the end of august was started on prednisone with taper. Medrol dose  "pack. Did this ultimately 3 times. He had noticeable improvement with each of these. Then due to this improvement he was started on 8mg of medrol. Had noted small/mild stomach pain starting in the summer of 2021. Had small glass of wine in nov which was some worse. Then in January this started to escalate. In Junuary he had also developed significant hives (previously seen in sept when on fluconazole). He had much worsening of stomach pain during this episode of hives. Small amount of eating or tums \"took the edge off\" of his stomach pain. Appetite was much reduced during this period. Pain was so severe that evening that he got up to take advil and had a fall (hit right shoulder/head). Was seen by PCP/given pepcid. Had CT head without bleed.     In June, was taking 6-8 advil per day. From June to Feb was taking 6-8 advil per day. This was with his steroid that was started in august.      Duodenal Ulcer \"benign appearing, but it is a bit unual in that it is completely circumferential with only 2mm of witdt and mild associated narrowing of the lumen\".     Has lost about 55 pounds from Jan 2021 to today. 10 with COVID. 10 with cleanse with natropath. It was on day 8 of this that he ended up in the hospital due to hyponatremia.     No HA. No vision change. No scalp tenderness. No jaw claudication symptoms. No dry cough. Has chills. No fevers/ night sweats. No epistaxis/hemoptysis. Has had canker sores on and off for years (seconary to peanuts). No blood in stool. No chest pain/shortness of breath. Abdominal symptoms above. No red/hot/swollen joints. No new rash. Has ulcers from pressure sores. Slowly healing. These are improving slowly. No symptoms consistent with dactylitis. No symptoms consistent with sclerodactyy.     Past Medical History  PVD  Hearing loss  Osteoarthritis in hands    Past Surgical History  Appenix  Cyst removal    Medications  Current Outpatient Medications   Medication     acetylcysteine (NAC) 600 " "MG CAPS capsule     Cholecalciferol (VITAMIN D-3) 125 MCG (5000 UT) TABS     famotidine (PEPCID) 20 MG tablet     Ferrous Sulfate (IRON PO)     FIBER PO     HYDROcodone-acetaminophen (NORCO) 5-325 MG tablet     hydroxychloroquine (PLAQUENIL) 200 MG tablet     MAGNESIUM PO     methocarbamol (ROBAXIN) 500 MG tablet     methylPREDNISolone (MEDROL DOSEPAK) 4 MG tablet therapy pack     Multiple Vitamin (MULTIVITAMIN PO)     Multiple Vitamin (STRESS FORMULA PO)     Multiple Vitamins-Minerals (IMMUNE SUPPORT PO)     Nutritional Supplements (DHEA PO)     Omega-3 Fatty Acids (OMEGA 3 PO)     omeprazole (PRILOSEC) 40 MG DR capsule     PROTEIN PO     PROTEIN PO     UNABLE TO FIND     UNABLE TO FIND     UNABLE TO FIND     UNABLE TO FIND     UNABLE TO FIND     UNABLE TO FIND     UNABLE TO FIND     UNABLE TO FIND     UNABLE TO FIND     UNABLE TO FIND     vitamin C (ASCORBIC ACID) 1000 MG TABS     No current facility-administered medications for this visit.     2.5 tabs. Decreasing by half every 2 weeks.   May 10th for liver  Oberto June 14th    Allergies     Allergies   Allergen Reactions     Fluconazole Hives, Itching and Rash     Hives like blisters without fluid arms and back and legs and chest       Family History  Strong family history of CA (lung or breast in 4 of 6 of his immediate family)    Social History  Prior smoker, quit 49. No ETOH since nov. No hx of drug use now or in the past.      Objective:   /59 (BP Location: Right arm, Patient Position: Chair, Cuff Size: Adult Regular)   Pulse 63   Ht 1.702 m (5' 7\")   Wt 66.6 kg (146 lb 14.4 oz)   SpO2 93%   BMI 23.01 kg/m    Physical exam:  GEN: sitting up unassisted, NAD, wife present with him  HEENT: No facial rash, sclera clear, no oral or nasal ulcers, no inflammatory nasal bridge/external ear changes, good saliva pool, looks   CV: RRR, no m/r/g, mild hypotension  Pulm: CTAB no crackles wheezing ronchi  Abdomen: soft, non tender, not distended, no " masses  Extremities: full active and passive ROM of bilateral shoulders/elbows, wrists. Can make full fist bilaterally. Knees/ankles/MTPs unremarkable. No synovitis of these joints. No dactylitis. No digital pitting. No nail changes. No sclerodactyly. Able to stand unassisted without the use of his arms from a seated position.  Skin: No acute cutaneous changes     Labs:  WBC Count   Date Value Ref Range Status   08/11/2022 5.6 4.0 - 11.0 10e3/uL Final     Hemoglobin   Date Value Ref Range Status   08/11/2022 11.9 (L) 13.3 - 17.7 g/dL Final     Platelet Count   Date Value Ref Range Status   08/11/2022 220 150 - 450 10e3/uL Final     Creatinine   Date Value Ref Range Status   08/11/2022 0.95 0.66 - 1.25 mg/dL Final     Lab Results   Component Value Date    ALKPHOS 65 08/11/2022     AST   Date Value Ref Range Status   08/11/2022 22 0 - 45 U/L Final     Lab Results   Component Value Date    ALT 20 08/11/2022     Erythrocyte Sedimentation Rate   Date Value Ref Range Status   08/11/2022 19 0 - 20 mm/hr Final     CRP Inflammation   Date Value Ref Range Status   08/11/2022 <2.9 0.0 - 8.0 mg/L Final     UA RESULTS:  Recent Labs   Lab Test 04/12/22  1115   COLOR Yellow   APPEARANCE Clear   URINEGLC Negative   URINEBILI Negative   URINEKETONE Negative   SG 1.023   UBLD Negative   URINEPH 5.0   PROTEIN Negative   NITRITE Negative   LEUKEST Negative   RBCU 1   WBCU 1      GEORGINA pattern 1   Date Value Ref Range Status   04/12/2022 Homogeneous  Final     DNA (ds) Antibody   Date Value Ref Range Status   04/12/2022 9.7 <10.0 IU/mL Final     Comment:     Negative     RNP Antibody IgG   Date Value Ref Range Status   04/12/2022 Negative Negative Final     3/25/2022  Serum electrophoresis no paraprotein identified    3/22/2022  Ferritin 563  Creatinine 0.62  AST 24  ALT 20  CRP 4  ESR 21    3/12/2022  WBC 4.3  Hemoglobin 9.5  Platelet 264    2/23/2022  Double-stranded DNA 38  SSA 1.3  Antichromatin antibody greater than 8  RNP  negative  [place] negative  SCL 70 -  SSB negative  Billie 1 -  Anticentromere negative  Hep C antibody negative    8/25/2021  Lyme IgG and IgM negative  CK normal at 40  CCP antibody negative    Imaging:  EXAM: CT CHEST WITH CONTRAST; CT ABDOMEN AND PELVIS WITH   CONTRAST     CLINICAL INFORMATION: Weight loss, abdominal pain     TECHNICAL INFORMATION:  The patient was given 3 cups of   water containing a total of 50 mL Omnipaque 300 to drink   prior to the exam.  After IV injection of 100 mL of   Omnipaque 300, axial sections were obtained from the   thoracic inlet through the symphysis pubis.  Reformations   were obtained in the coronal and sagittal planes.  No   immediate complications were seen.       COMPARISON: Ultrasound 8/26/2021     INTERPRETATION:     Chest:     The lungs are clear.  No suspicious pulmonary nodules or   masses.     Heart size is normal; aortic and coronary atherosclerosis   noted.  No pericardial or pleural effusion.     No intrathoracic lymphadenopathy by size criteria.     Abdomen:     The liver, bile ducts, pancreas, spleen, adrenal glands, and   kidneys are within normal limits.  There is cholelithiasis   without evidence of cholecystitis.  Visualized intestines   are unremarkable.     No abdominopelvic lymphadenopathy by size criteria.   Negative for ascites.  There is atherosclerosis of the aorta   and its branches without aneurysm.     Pelvis:     The prostate measures 4.1 x 5.0 cm.     Urinary bladder is partially collapsed but grossly normal.     Musculoskeletal:     No acute or suspicious bony abnormality.     CONCLUSION:     1. No CT findings to explain the patient's symptoms.   2. Prostatic enlargement.

## 2022-08-11 NOTE — NURSING NOTE
"Chief Complaint   Patient presents with     RECHECK     4 month return visit.     /59 (BP Location: Right arm, Patient Position: Chair, Cuff Size: Adult Regular)   Pulse 63   Ht 1.702 m (5' 7\")   Wt 66.6 kg (146 lb 14.4 oz)   SpO2 93%   BMI 23.01 kg/m    Mary Jackson MA on 8/11/2022 at 8:25 AM    "

## 2022-09-11 ENCOUNTER — HEALTH MAINTENANCE LETTER (OUTPATIENT)
Age: 77
End: 2022-09-11

## 2023-02-01 ENCOUNTER — OFFICE VISIT (OUTPATIENT)
Dept: RHEUMATOLOGY | Facility: CLINIC | Age: 78
End: 2023-02-01
Attending: INTERNAL MEDICINE
Payer: COMMERCIAL

## 2023-02-01 ENCOUNTER — LAB (OUTPATIENT)
Dept: LAB | Facility: CLINIC | Age: 78
End: 2023-02-01
Payer: COMMERCIAL

## 2023-02-01 VITALS
SYSTOLIC BLOOD PRESSURE: 110 MMHG | HEART RATE: 75 BPM | DIASTOLIC BLOOD PRESSURE: 71 MMHG | BODY MASS INDEX: 23.89 KG/M2 | HEIGHT: 67 IN | WEIGHT: 152.2 LBS

## 2023-02-01 DIAGNOSIS — Z79.899 ENCOUNTER FOR LONG-TERM (CURRENT) USE OF HIGH-RISK MEDICATION: ICD-10-CM

## 2023-02-01 DIAGNOSIS — M35.3 PMR (POLYMYALGIA RHEUMATICA) (H): ICD-10-CM

## 2023-02-01 DIAGNOSIS — G89.29 CHRONIC BILATERAL THORACIC BACK PAIN: ICD-10-CM

## 2023-02-01 DIAGNOSIS — M35.3 PMR (POLYMYALGIA RHEUMATICA) (H): Primary | ICD-10-CM

## 2023-02-01 DIAGNOSIS — M54.6 CHRONIC BILATERAL THORACIC BACK PAIN: ICD-10-CM

## 2023-02-01 LAB
ALBUMIN SERPL BCG-MCNC: 4.1 G/DL (ref 3.5–5.2)
ALT SERPL W P-5'-P-CCNC: 13 U/L (ref 10–50)
AST SERPL W P-5'-P-CCNC: 26 U/L (ref 10–50)
BASOPHILS # BLD AUTO: 0 10E3/UL (ref 0–0.2)
BASOPHILS NFR BLD AUTO: 1 %
CREAT SERPL-MCNC: 1.01 MG/DL (ref 0.67–1.17)
CRP SERPL-MCNC: <3 MG/L
EOSINOPHIL # BLD AUTO: 0.2 10E3/UL (ref 0–0.7)
EOSINOPHIL NFR BLD AUTO: 4 %
ERYTHROCYTE [DISTWIDTH] IN BLOOD BY AUTOMATED COUNT: 12.4 % (ref 10–15)
ERYTHROCYTE [SEDIMENTATION RATE] IN BLOOD BY WESTERGREN METHOD: 16 MM/HR (ref 0–20)
GFR SERPL CREATININE-BSD FRML MDRD: 77 ML/MIN/1.73M2
HCT VFR BLD AUTO: 37.7 % (ref 40–53)
HGB BLD-MCNC: 12.3 G/DL (ref 13.3–17.7)
IMM GRANULOCYTES # BLD: 0 10E3/UL
IMM GRANULOCYTES NFR BLD: 0 %
LYMPHOCYTES # BLD AUTO: 1.1 10E3/UL (ref 0.8–5.3)
LYMPHOCYTES NFR BLD AUTO: 23 %
MCH RBC QN AUTO: 30 PG (ref 26.5–33)
MCHC RBC AUTO-ENTMCNC: 32.6 G/DL (ref 31.5–36.5)
MCV RBC AUTO: 92 FL (ref 78–100)
MONOCYTES # BLD AUTO: 0.5 10E3/UL (ref 0–1.3)
MONOCYTES NFR BLD AUTO: 10 %
NEUTROPHILS # BLD AUTO: 3 10E3/UL (ref 1.6–8.3)
NEUTROPHILS NFR BLD AUTO: 62 %
NRBC # BLD AUTO: 0 10E3/UL
NRBC BLD AUTO-RTO: 0 /100
PLATELET # BLD AUTO: 249 10E3/UL (ref 150–450)
RBC # BLD AUTO: 4.1 10E6/UL (ref 4.4–5.9)
WBC # BLD AUTO: 4.9 10E3/UL (ref 4–11)

## 2023-02-01 PROCEDURE — 36415 COLL VENOUS BLD VENIPUNCTURE: CPT | Performed by: PATHOLOGY

## 2023-02-01 PROCEDURE — 84460 ALANINE AMINO (ALT) (SGPT): CPT | Performed by: PATHOLOGY

## 2023-02-01 PROCEDURE — 84450 TRANSFERASE (AST) (SGOT): CPT | Performed by: PATHOLOGY

## 2023-02-01 PROCEDURE — G0463 HOSPITAL OUTPT CLINIC VISIT: HCPCS | Performed by: INTERNAL MEDICINE

## 2023-02-01 PROCEDURE — 99214 OFFICE O/P EST MOD 30 MIN: CPT | Performed by: INTERNAL MEDICINE

## 2023-02-01 PROCEDURE — 82565 ASSAY OF CREATININE: CPT | Performed by: PATHOLOGY

## 2023-02-01 PROCEDURE — 85025 COMPLETE CBC W/AUTO DIFF WBC: CPT | Performed by: PATHOLOGY

## 2023-02-01 PROCEDURE — 82040 ASSAY OF SERUM ALBUMIN: CPT | Performed by: PATHOLOGY

## 2023-02-01 PROCEDURE — 86140 C-REACTIVE PROTEIN: CPT | Performed by: PATHOLOGY

## 2023-02-01 PROCEDURE — 85652 RBC SED RATE AUTOMATED: CPT | Performed by: PATHOLOGY

## 2023-02-01 ASSESSMENT — PAIN SCALES - GENERAL: PAINLEVEL: MILD PAIN (3)

## 2023-02-01 NOTE — PATIENT INSTRUCTIONS
1) labs today  2) send my chart message with name/location of your eye clinic. We will then obtain the records.   3) follow-up in 6 months

## 2023-02-01 NOTE — PROGRESS NOTES
Outpatient Rheumatology follow-up    Name: Merlin Weinhold    MRN [id number]   Today's date: 2/1/23  Date of last visit: 8/11/2022         Reason for follow-up: PMR   Primary care physician: Cristy Connolly MD             Assessment & Plan:   77 year old male who co-managed by rheumatologist Dr Smith  for PMR on HCQ.    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 Was previously on methylprednisolone 4 mg daily but tapered off around September 2022 with minimal flare in symptoms. Pt reporting some worsening cold intolerance in the hands, but history does not suggest Raynaud's, prior TSH wnl. Previous labs from 8/2022 with normal ESR, CRP; will repeat labs again today. Pt completed eye exam in December at [facility] for Plaquenil monitoring - will reach out to them to obtain records.  -Continue HCQ 200mg BID  -Labs today  - Will plan to coordinate with [facility] (Dr. Angel Williamson) to obtain records re: Plaquenil toxicity monitoring  -Follow-up in 6 months     #Chronic cervical/thoracic back pain  Pt reports persistent back pain, worse later in the day, not exacerbated by activity. Review of [facility] Records demonstrates an MRI of the cervical spine showing multilevel cervical spondylosis, with moderate to severe multilevel foraminal stenosis (see MRI cervical spine from 2/18/2022). Since last clinic visit he has undergone prolotherapy with minor improvement in symptoms. Exam today shows no point tenderness along the spine concerning for compression fracture in the setting of chronic steroid use, but rather significant tenderness to palpation in the paraspinal muscles bilaterally. Based on exam, etiology of pain seems more muscular than degenerative disease of the spine itself. Discussed treatment options with the patient including PT vs Flexeril or another muscle relaxant - he elects for PT referral.  - PT referral  "placed.    #Positive GEORGINA: Reviewing his serologies obtained with his visit today, his GEORGINA is 1:1280 in a homogeneous pattern. SSA, SSB, smith, RNP, dsDNA, C4 are all negative/normal. His C3 is borderline low of which in this setting I am not concerned.  A prior work-up in 2/23/22 showed elevated GEORGINA/chromatin/dsDNA/SSA which I would note was drawn around the time he was admitted to the hospital for severe hyponatremia secondary to a \"cleanse\" prescribed by a naturopath- more on this below. I suspect that his positive antibodies at that time, which can be seen in the setting of medications/supplements etc, and are negative now may have been secondary to his exposure to whatever was in that.    #High risk medication use: Hydroxychloroquine  -Risks and benefits of HCQ discussed to include rash (including severe rash/SJS/TEN), HA, GI upset, hepatoxicity, retinal toxicity, need for yearly screening OCT exam, need for CBC, CMP, ESR, CRP for routine screening drug toxicity labs among others. Patient is agreeable to continue.  - Will reach out to [facility] for records as above.  -Routine screening drug toxicity labs obtained today as above.  We will continue routine drug toxicity screening labs every 6 months  -No drug toxicity from HCQ identified by review of his most recent labs results drawn today to include CBC with diff, ESR, CRP, AST, ALT, creatinine.    Patient was seen with attending rheumatologist, Dr Arreguin. I acted as a scribe during this encounter.     Amrit Kennedy MS4     Staff Addendum:  This patient was interviewed and examined in the presence of the medical student who was acting as a scribe, and this note personally edited by me reflects our mutual impression. I personally performed the history and physical exam. I personally reviewed available lab and imaging studies.    Silvestre Arreguin MD  Rheumatology       Subjective:   Interval History 2/1/2023:  Pt reports he has been doing well since " his last visit. Tapered completely off steroids around September of 2022 (unsure of exact date), and perhaps noted a slight increase in upper back pain but otherwise feels symptoms have been controlled since last visit. No issues with ROM, strength in the shoulders, hips like before, tolerating ADLs well. No morning stiffness. No scalp tenderness, headaches, changes in vision, fevers, chills, night sweats, joint pain, oral lesions. No GI symptoms. Has been tolerating Plaquenil well, no changes in vision and had eye exam with [facility] in December. Biggest concern today is cold intolerance, worse in the hands, but also more generalized in the rest of the body. Pt reports hands feel constantly cold, which is exacerbated by cold exposure when outside. No numbness, paresthesias. No color changes to the digits.     Interval history 8/11/2022   has pain in the upper back in the afternoons, between 3-4pm. Lasts for the rest of the day. Takes vicodin in the afternoon another 8pm. In the AM it is gone. His current dose of steroids is 1 tablet of methylprednisolone 4mg each morning. Has been on this dose for about 10 weeks. Shortly after decreasing to that dose, he noticed that his shoulder symptoms were some worse.  Though not by much. No side effects from HCQ. No GI complaints any longer. Up about 16 pounds. No GCA symptoms. Occasional hip discomfort after a short walk. Able to get up out of a chair without any difficulty. Shoulder ROM is much improved. Overall he is functionally improved from his PMR treatment. He and his wife are pleased with him improvement during this time. No interval infections. no fevers chills night sweats.  No new rashes.  14 point review of systems collected and otherwise negative.    HPI from initial consultation 4/11/2022 Jan/Feb, both shoulders became painful. Could lift his arms above his shoulders. Went to PT for 4 weeks, twice weekly and his ROM inproved, though pain did not  "resolve. He went back to PT for another 2-3 weeks. And pain still did not resolve. And plan was to continue PT at home and was doing this daily 5x/week. He then had left hip pain as well, and so had injection into left hip then bilateral shoulders as well. Though this only lasted for days to weeks. So he had second injection into right shoulder, which again, not much helpful. Then in June, had hyponatremia and was admitted to [facility]. At the end of august was started on prednisone with taper. Medrol dose pack. Did this ultimately 3 times. He had noticeable improvement with each of these. Then due to this improvement he was started on 8mg of medrol. Had noted small/mild stomach pain starting in the summer of 2021. Had small glass of wine in nov which was some worse. Then in January this started to escalate. In Junuary he had also developed significant hives (previously seen in sept when on fluconazole). He had much worsening of stomach pain during this episode of hives. Small amount of eating or tums \"took the edge off\" of his stomach pain. Appetite was much reduced during this period. Pain was so severe that evening that he got up to take advil and had a fall (hit right shoulder/head). Was seen by PCP/given pepcid. Had CT head without bleed.     In June, was taking 6-8 advil per day. From June to Feb was taking 6-8 advil per day. This was with his steroid that was started in august.      Duodenal Ulcer \"benign appearing, but it is a bit unual in that it is completely circumferential with only 2mm of witdt and mild associated narrowing of the lumen\".     Has lost about 55 pounds from Jan 2021 to today. 10 with COVID. 10 with cleanse with natropath. It was on day 8 of this that he ended up in the hospital due to hyponatremia.     No HA. No vision change. No scalp tenderness. No jaw claudication symptoms. No dry cough. Has chills. No fevers/ night sweats. No epistaxis/hemoptysis. Has had canker sores on and off " for years (seconary to peanuts). No blood in stool. No chest pain/shortness of breath. Abdominal symptoms above. No red/hot/swollen joints. No new rash. Has ulcers from pressure sores. Slowly healing. These are improving slowly. No symptoms consistent with dactylitis. No symptoms consistent with sclerodactyy.     Past Medical History  PVD  Hearing loss  Osteoarthritis in hands    Past Surgical History  Appendix  Cyst removal    Medications  Current Outpatient Medications   Medication     acetylcysteine (N-ACETYL CYSTEINE) 600 MG CAPS capsule     Cholecalciferol (VITAMIN D-3) 125 MCG (5000 UT) TABS     famotidine (PEPCID) 20 MG tablet     Ferrous Sulfate (IRON PO)     FIBER PO     HYDROcodone-acetaminophen (NORCO) 5-325 MG tablet     hydroxychloroquine (PLAQUENIL) 200 MG tablet     MAGNESIUM PO     methocarbamol (ROBAXIN) 500 MG tablet     methylPREDNISolone (MEDROL DOSEPAK) 4 MG tablet therapy pack     Multiple Vitamin (MULTIVITAMIN PO)     Multiple Vitamin (STRESS FORMULA PO)     Multiple Vitamins-Minerals (IMMUNE SUPPORT PO)     Nutritional Supplements (DHEA PO)     Omega-3 Fatty Acids (OMEGA 3 PO)     omeprazole (PRILOSEC) 40 MG DR capsule     PROTEIN PO     PROTEIN PO     UNABLE TO FIND     UNABLE TO FIND     UNABLE TO FIND     UNABLE TO FIND     UNABLE TO FIND     UNABLE TO FIND     UNABLE TO FIND     UNABLE TO FIND     UNABLE TO FIND     UNABLE TO FIND     vitamin C (ASCORBIC ACID) 1000 MG TABS     No current facility-administered medications for this visit.     2.5 tabs. Decreasing by half every 2 weeks.   May 10th for liver  Oberto June 14th    Allergies     Allergies   Allergen Reactions     Fluconazole Hives, Itching and Rash     Hives like blisters without fluid arms and back and legs and chest       Family History  Strong family history of CA (lung or breast in 4 of 6 of his immediate family)    Social History  Prior smoker, quit 49. No ETOH since nov. No hx of drug use now or in the past.       Objective:   There were no vitals taken for this visit.  Physical exam:  GEN: sitting up unassisted, NAD, wife present with him  HEENT: No facial rash, sclera clear, no oral or nasal ulcers, no inflammatory nasal bridge/external ear changes, good saliva pool, looks   CV: RRR, no m/r/g  Pulm: CTAB no crackles wheezing ronchi  Abdomen: soft, non tender, not distended, no masses  Extremities: full active and passive ROM of bilateral shoulders/elbows, wrists. Can make full fist bilaterally, 5/5  strength. Knees/ankles/MTPs unremarkable. No synovitis of these joints. No dactylitis. No digital pitting. No nail changes. No sclerodactyly. Able to stand unassisted without the use of his arms from a seated position. No point tenderness to palpation of the spine, but significant tenderness to palpation of paraspinal muscles in the cervical region.  Skin: ~3 mm brown macule on the left upper forehead with some surrounding excoriations, no other acute skin changes    Labs:  WBC Count   Date Value Ref Range Status   08/11/2022 5.6 4.0 - 11.0 10e3/uL Final     Hemoglobin   Date Value Ref Range Status   08/11/2022 11.9 (L) 13.3 - 17.7 g/dL Final     Platelet Count   Date Value Ref Range Status   08/11/2022 220 150 - 450 10e3/uL Final     Creatinine   Date Value Ref Range Status   08/11/2022 0.95 0.66 - 1.25 mg/dL Final     Lab Results   Component Value Date    ALKPHOS 65 08/11/2022     AST   Date Value Ref Range Status   08/11/2022 22 0 - 45 U/L Final     Lab Results   Component Value Date    ALT 20 08/11/2022     Erythrocyte Sedimentation Rate   Date Value Ref Range Status   08/11/2022 19 0 - 20 mm/hr Final     CRP Inflammation   Date Value Ref Range Status   08/11/2022 <2.9 0.0 - 8.0 mg/L Final     UA RESULTS:  Recent Labs   Lab Test 04/12/22  1115   COLOR Yellow   APPEARANCE Clear   URINEGLC Negative   URINEBILI Negative   URINEKETONE Negative   SG 1.023   UBLD Negative   URINEPH 5.0   PROTEIN Negative   NITRITE Negative    LEUKEST Negative   RBCU 1   WBCU 1      GEORGINA pattern 1   Date Value Ref Range Status   04/12/2022 Homogeneous  Final     DNA (ds) Antibody   Date Value Ref Range Status   04/12/2022 9.7 <10.0 IU/mL Final     Comment:     Negative     RNP Antibody IgG   Date Value Ref Range Status   04/12/2022 Negative Negative Final     3/25/2022  Serum electrophoresis no paraprotein identified    3/22/2022  Ferritin 563  Creatinine 0.62  AST 24  ALT 20  CRP 4  ESR 21    3/12/2022  WBC 4.3  Hemoglobin 9.5  Platelet 264    2/23/2022  Double-stranded DNA 38  SSA 1.3  Antichromatin antibody greater than 8  RNP negative  [place] negative  SCL 70 -  SSB negative  Billie 1 -  Anticentromere negative  Hep C antibody negative    8/25/2021  Lyme IgG and IgM negative  CK normal at 40  CCP antibody negative    Imaging:  EXAM: CT CHEST WITH CONTRAST; CT ABDOMEN AND PELVIS WITH   CONTRAST     CLINICAL INFORMATION: Weight loss, abdominal pain     TECHNICAL INFORMATION:  The patient was given 3 cups of   water containing a total of 50 mL Omnipaque 300 to drink   prior to the exam.  After IV injection of 100 mL of   Omnipaque 300, axial sections were obtained from the   thoracic inlet through the symphysis pubis.  Reformations   were obtained in the coronal and sagittal planes.  No   immediate complications were seen.       COMPARISON: Ultrasound 8/26/2021     INTERPRETATION:     Chest:     The lungs are clear.  No suspicious pulmonary nodules or   masses.     Heart size is normal; aortic and coronary atherosclerosis   noted.  No pericardial or pleural effusion.     No intrathoracic lymphadenopathy by size criteria.     Abdomen:     The liver, bile ducts, pancreas, spleen, adrenal glands, and   kidneys are within normal limits.  There is cholelithiasis   without evidence of cholecystitis.  Visualized intestines   are unremarkable.     No abdominopelvic lymphadenopathy by size criteria.   Negative for ascites.  There is atherosclerosis of the aorta   and  its branches without aneurysm.     Pelvis:     The prostate measures 4.1 x 5.0 cm.     Urinary bladder is partially collapsed but grossly normal.     Musculoskeletal:     No acute or suspicious bony abnormality.     CONCLUSION:     1. No CT findings to explain the patient's symptoms.   2. Prostatic enlargement.

## 2023-02-01 NOTE — LETTER
2/1/2023       RE: Merlin J Weinhold  [address] [facility] [address]     Dear Colleague,    Thank you for referring your patient, Merlin J Weinhold, to the [facility] [place] at [facility]. Please see a copy of my visit note below.      Outpatient Rheumatology follow-up    Name: Merlin Weinhold    MRN [id number]   Today's date: 2/1/23  Date of last visit: 8/11/2022         Reason for follow-up: PMR   Primary care physician: Cristy Connolly MD             Assessment & Plan:   77 year old male who co-managed by rheumatologist Dr Smith  for PMR on HCQ.    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 Was previously on methylprednisolone 4 mg daily but tapered off around September 2022 with minimal flare in symptoms. Pt reporting some worsening cold intolerance in the hands, but history does not suggest Raynaud's, prior TSH wnl. Previous labs from 8/2022 with normal ESR, CRP; will repeat labs again today. Pt completed eye exam in December at [facility] for Plaquenil monitoring - will reach out to them to obtain records.  -Continue HCQ 200mg BID  -Labs today  - Will plan to coordinate with [facility] (Dr. Angel Williamson) to obtain records re: Plaquenil toxicity monitoring  -Follow-up in 6 months     #Chronic cervical/thoracic back pain  Pt reports persistent back pain, worse later in the day, not exacerbated by activity. Review of [facility] Records demonstrates an MRI of the cervical spine showing multilevel cervical spondylosis, with moderate to severe multilevel foraminal stenosis (see MRI cervical spine from 2/18/2022). Since last clinic visit he has undergone prolotherapy with minor improvement in symptoms. Exam today shows no point tenderness along the spine concerning for compression fracture in the setting of chronic steroid use, but rather  "significant tenderness to palpation in the paraspinal muscles bilaterally. Based on exam, etiology of pain seems more muscular than degenerative disease of the spine itself. Discussed treatment options with the patient including PT vs Flexeril or another muscle relaxant - he elects for PT referral.  - PT referral placed.    #Positive GEORGINA: Reviewing his serologies obtained with his visit today, his GEORGINA is 1:1280 in a homogeneous pattern. SSA, SSB, smith, RNP, dsDNA, C4 are all negative/normal. His C3 is borderline low of which in this setting I am not concerned.  A prior work-up in 2/23/22 showed elevated GEORGINA/chromatin/dsDNA/SSA which I would note was drawn around the time he was admitted to the hospital for severe hyponatremia secondary to a \"cleanse\" prescribed by a naturopath- more on this below. I suspect that his positive antibodies at that time, which can be seen in the setting of medications/supplements etc, and are negative now may have been secondary to his exposure to whatever was in that.    #High risk medication use: Hydroxychloroquine  -Risks and benefits of HCQ discussed to include rash (including severe rash/SJS/TEN), HA, GI upset, hepatoxicity, retinal toxicity, need for yearly screening OCT exam, need for CBC, CMP, ESR, CRP for routine screening drug toxicity labs among others. Patient is agreeable to continue.  - Will reach out to [facility] for records as above.  -Routine screening drug toxicity labs obtained today as above.  We will continue routine drug toxicity screening labs every 6 months  -No drug toxicity from HCQ identified by review of his most recent labs results drawn today to include CBC with diff, ESR, CRP, AST, ALT, creatinine.    Patient was seen with attending rheumatologist, Dr Arreguin. I acted as a scribe during this encounter.     Amrit Kennedy MS4     Staff Addendum:  This patient was interviewed and examined in the presence of the medical student who was acting as " a scribe, and this note personally edited by me reflects our mutual impression. I personally performed the history and physical exam. I personally reviewed available lab and imaging studies.    Silvestre Arreguin MD  Rheumatology       Subjective:   Interval History 2/1/2023:  Pt reports he has been doing well since his last visit. Tapered completely off steroids around September of 2022 (unsure of exact date), and perhaps noted a slight increase in upper back pain but otherwise feels symptoms have been controlled since last visit. No issues with ROM, strength in the shoulders, hips like before, tolerating ADLs well. No morning stiffness. No scalp tenderness, headaches, changes in vision, fevers, chills, night sweats, joint pain, oral lesions. No GI symptoms. Has been tolerating Plaquenil well, no changes in vision and had eye exam with [facility] in December. Biggest concern today is cold intolerance, worse in the hands, but also more generalized in the rest of the body. Pt reports hands feel constantly cold, which is exacerbated by cold exposure when outside. No numbness, paresthesias. No color changes to the digits.     Interval history 8/11/2022   has pain in the upper back in the afternoons, between 3-4pm. Lasts for the rest of the day. Takes vicodin in the afternoon another 8pm. In the AM it is gone. His current dose of steroids is 1 tablet of methylprednisolone 4mg each morning. Has been on this dose for about 10 weeks. Shortly after decreasing to that dose, he noticed that his shoulder symptoms were some worse.  Though not by much. No side effects from HCQ. No GI complaints any longer. Up about 16 pounds. No GCA symptoms. Occasional hip discomfort after a short walk. Able to get up out of a chair without any difficulty. Shoulder ROM is much improved. Overall he is functionally improved from his PMR treatment. He and his wife are pleased with him improvement during this time. No interval infections.  "no fevers chills night sweats.  No new rashes.  14 point review of systems collected and otherwise negative.    HPI from initial consultation 4/11/2022 Jan/Feb, both shoulders became painful. Could lift his arms above his shoulders. Went to PT for 4 weeks, twice weekly and his ROM inproved, though pain did not resolve. He went back to PT for another 2-3 weeks. And pain still did not resolve. And plan was to continue PT at home and was doing this daily 5x/week. He then had left hip pain as well, and so had injection into left hip then bilateral shoulders as well. Though this only lasted for days to weeks. So he had second injection into right shoulder, which again, not much helpful. Then in June, had hyponatremia and was admitted to [facility]. At the end of august was started on prednisone with taper. Medrol dose pack. Did this ultimately 3 times. He had noticeable improvement with each of these. Then due to this improvement he was started on 8mg of medrol. Had noted small/mild stomach pain starting in the summer of 2021. Had small glass of wine in nov which was some worse. Then in January this started to escalate. In Junuary he had also developed significant hives (previously seen in sept when on fluconazole). He had much worsening of stomach pain during this episode of hives. Small amount of eating or tums \"took the edge off\" of his stomach pain. Appetite was much reduced during this period. Pain was so severe that evening that he got up to take advil and had a fall (hit right shoulder/head). Was seen by PCP/given pepcid. Had CT head without bleed.     In June, was taking 6-8 advil per day. From June to Feb was taking 6-8 advil per day. This was with his steroid that was started in august.      Duodenal Ulcer \"benign appearing, but it is a bit unual in that it is completely circumferential with only 2mm of witdt and mild associated narrowing of the lumen\".     Has lost about 55 pounds from Jan 2021 to today. " 10 with COVID. 10 with cleanse with natropath. It was on day 8 of this that he ended up in the hospital due to hyponatremia.     No HA. No vision change. No scalp tenderness. No jaw claudication symptoms. No dry cough. Has chills. No fevers/ night sweats. No epistaxis/hemoptysis. Has had canker sores on and off for years (seconary to peanuts). No blood in stool. No chest pain/shortness of breath. Abdominal symptoms above. No red/hot/swollen joints. No new rash. Has ulcers from pressure sores. Slowly healing. These are improving slowly. No symptoms consistent with dactylitis. No symptoms consistent with sclerodactyy.     Past Medical History  PVD  Hearing loss  Osteoarthritis in hands    Past Surgical History  Appendix  Cyst removal    Medications  Current Outpatient Medications   Medication     acetylcysteine (N-ACETYL CYSTEINE) 600 MG CAPS capsule     Cholecalciferol (VITAMIN D-3) 125 MCG (5000 UT) TABS     famotidine (PEPCID) 20 MG tablet     Ferrous Sulfate (IRON PO)     FIBER PO     HYDROcodone-acetaminophen (NORCO) 5-325 MG tablet     hydroxychloroquine (PLAQUENIL) 200 MG tablet     MAGNESIUM PO     methocarbamol (ROBAXIN) 500 MG tablet     methylPREDNISolone (MEDROL DOSEPAK) 4 MG tablet therapy pack     Multiple Vitamin (MULTIVITAMIN PO)     Multiple Vitamin (STRESS FORMULA PO)     Multiple Vitamins-Minerals (IMMUNE SUPPORT PO)     Nutritional Supplements (DHEA PO)     Omega-3 Fatty Acids (OMEGA 3 PO)     omeprazole (PRILOSEC) 40 MG DR capsule     PROTEIN PO     PROTEIN PO     UNABLE TO FIND     UNABLE TO FIND     UNABLE TO FIND     UNABLE TO FIND     UNABLE TO FIND     UNABLE TO FIND     UNABLE TO FIND     UNABLE TO FIND     UNABLE TO FIND     UNABLE TO FIND     vitamin C (ASCORBIC ACID) 1000 MG TABS     No current facility-administered medications for this visit.     2.5 tabs. Decreasing by half every 2 weeks.   May 10th for liver  Oberto June 14th    Allergies     Allergies   Allergen Reactions      Fluconazole Hives, Itching and Rash     Hives like blisters without fluid arms and back and legs and chest       Family History  Strong family history of CA (lung or breast in 4 of 6 of his immediate family)    Social History  Prior smoker, quit 49. No ETOH since nov. No hx of drug use now or in the past.      Objective:   There were no vitals taken for this visit.  Physical exam:  GEN: sitting up unassisted, NAD, wife present with him  HEENT: No facial rash, sclera clear, no oral or nasal ulcers, no inflammatory nasal bridge/external ear changes, good saliva pool, looks   CV: RRR, no m/r/g  Pulm: CTAB no crackles wheezing ronchi  Abdomen: soft, non tender, not distended, no masses  Extremities: full active and passive ROM of bilateral shoulders/elbows, wrists. Can make full fist bilaterally, 5/5  strength. Knees/ankles/MTPs unremarkable. No synovitis of these joints. No dactylitis. No digital pitting. No nail changes. No sclerodactyly. Able to stand unassisted without the use of his arms from a seated position. No point tenderness to palpation of the spine, but significant tenderness to palpation of paraspinal muscles in the cervical region.  Skin: ~3 mm brown macule on the left upper forehead with some surrounding excoriations, no other acute skin changes    Labs:  WBC Count   Date Value Ref Range Status   08/11/2022 5.6 4.0 - 11.0 10e3/uL Final     Hemoglobin   Date Value Ref Range Status   08/11/2022 11.9 (L) 13.3 - 17.7 g/dL Final     Platelet Count   Date Value Ref Range Status   08/11/2022 220 150 - 450 10e3/uL Final     Creatinine   Date Value Ref Range Status   08/11/2022 0.95 0.66 - 1.25 mg/dL Final     Lab Results   Component Value Date    ALKPHOS 65 08/11/2022     AST   Date Value Ref Range Status   08/11/2022 22 0 - 45 U/L Final     Lab Results   Component Value Date    ALT 20 08/11/2022     Erythrocyte Sedimentation Rate   Date Value Ref Range Status   08/11/2022 19 0 - 20 mm/hr Final     CRP  Inflammation   Date Value Ref Range Status   08/11/2022 <2.9 0.0 - 8.0 mg/L Final     UA RESULTS:  Recent Labs   Lab Test 04/12/22  1115   COLOR Yellow   APPEARANCE Clear   URINEGLC Negative   URINEBILI Negative   URINEKETONE Negative   SG 1.023   UBLD Negative   URINEPH 5.0   PROTEIN Negative   NITRITE Negative   LEUKEST Negative   RBCU 1   WBCU 1      GEORGINA pattern 1   Date Value Ref Range Status   04/12/2022 Homogeneous  Final     DNA (ds) Antibody   Date Value Ref Range Status   04/12/2022 9.7 <10.0 IU/mL Final     Comment:     Negative     RNP Antibody IgG   Date Value Ref Range Status   04/12/2022 Negative Negative Final     3/25/2022  Serum electrophoresis no paraprotein identified    3/22/2022  Ferritin 563  Creatinine 0.62  AST 24  ALT 20  CRP 4  ESR 21    3/12/2022  WBC 4.3  Hemoglobin 9.5  Platelet 264    2/23/2022  Double-stranded DNA 38  SSA 1.3  Antichromatin antibody greater than 8  RNP negative  M[place]onado negative  SCL 70 -  SSB negative  Billie 1 -  Anticentromere negative  Hep C antibody negative    8/25/2021  Lyme IgG and IgM negative  CK normal at 40  CCP antibody negative    Imaging:  EXAM: CT CHEST WITH CONTRAST; CT ABDOMEN AND PELVIS WITH   CONTRAST     CLINICAL INFORMATION: Weight loss, abdominal pain     TECHNICAL INFORMATION:  The patient was given 3 cups of   water containing a total of 50 mL Omnipaque 300 to drink   prior to the exam.  After IV injection of 100 mL of   Omnipaque 300, axial sections were obtained from the   thoracic inlet through the symphysis pubis.  Reformations   were obtained in the coronal and sagittal planes.  No   immediate complications were seen.       COMPARISON: Ultrasound 8/26/2021     INTERPRETATION:     Chest:     The lungs are clear.  No suspicious pulmonary nodules or   masses.     Heart size is normal; aortic and coronary atherosclerosis   noted.  No pericardial or pleural effusion.     No intrathoracic lymphadenopathy by size criteria.     Abdomen:     The liver,  bile ducts, pancreas, spleen, adrenal glands, and   kidneys are within normal limits.  There is cholelithiasis   without evidence of cholecystitis.  Visualized intestines   are unremarkable.     No abdominopelvic lymphadenopathy by size criteria.   Negative for ascites.  There is atherosclerosis of the aorta   and its branches without aneurysm.     Pelvis:     The prostate measures 4.1 x 5.0 cm.     Urinary bladder is partially collapsed but grossly normal.     Musculoskeletal:     No acute or suspicious bony abnormality.     CONCLUSION:     1. No CT findings to explain the patient's symptoms.   2. Prostatic enlargement.

## 2023-02-01 NOTE — NURSING NOTE
"Chief Complaint   Patient presents with     RECHECK     Follow up     /71   Pulse 75   Ht 1.702 m (5' 7\")   Wt 69 kg (152 lb 3.2 oz)   BMI 23.84 kg/m    Daina Villa CMA on 2/1/2023 at 8:24 AM    "

## 2023-02-03 ENCOUNTER — THERAPY VISIT (OUTPATIENT)
Dept: PHYSICAL THERAPY | Facility: CLINIC | Age: 78
End: 2023-02-03
Attending: INTERNAL MEDICINE
Payer: COMMERCIAL

## 2023-02-03 DIAGNOSIS — M54.6 CHRONIC BILATERAL THORACIC BACK PAIN: ICD-10-CM

## 2023-02-03 DIAGNOSIS — G89.29 CHRONIC BILATERAL THORACIC BACK PAIN: ICD-10-CM

## 2023-02-03 PROCEDURE — 97110 THERAPEUTIC EXERCISES: CPT | Mod: GP | Performed by: PHYSICAL THERAPIST

## 2023-02-03 PROCEDURE — 97161 PT EVAL LOW COMPLEX 20 MIN: CPT | Mod: GP | Performed by: PHYSICAL THERAPIST

## 2023-02-03 NOTE — PROGRESS NOTES
[facility]    OUTPATIENT Physical Therapy ORTHOPEDIC EVALUATION  PLAN OF TREATMENT FOR OUTPATIENT REHABILITATION  (COMPLETE FOR INITIAL CLAIMS ONLY)  Patient's Last Name, First Name, M.I.  YOB: 1945  Weinhold,Merlin J    Provider s Name:  [facility]   Medical Record No.  [id number]   Start of Care Date:  02/03/23   Onset Date:   02/01/23   Treatment Diagnosis:  chronic thoracic pain Medical Diagnosis:  Chronic bilateral thoracic back pain       Goals:     02/03/23 0500   Body Part   Goals listed below are for thoracic pain   Goal #1   Goal #1 sitting   Current Functional Level Hours patient can sit   Performance level 2 hours in evening 4/10p   STG Target Performance Hours patient will be able to sit   Performance level 2 hours in evening 2/10p   Rationale for personal hygiene;to allow rest from standing   Due date 03/03/23   LTG Target Performance Hours patient will be able to sit   Performance Level 2 hours in evening 1/10p   Rationale for personal hygiene;to allow rest from standing   Due date 03/31/23         Therapy Frequency:  1x a week  Predicted Duration of Therapy Intervention:  8 weeks    Maria Luisa Echavarria, PT                 I CERTIFY THE NEED FOR THESE SERVICES FURNISHED UNDER        THIS PLAN OF TREATMENT AND WHILE UNDER MY CARE     (Physician attestation of this document indicates review and certification of the therapy plan).                     Certification Date From:  02/03/23   Certification Date To:  03/31/23    Referring Provider:  Silvestre Arreguin    Initial Assessment        See Epic Evaluation SOC Date: 02/03/23

## 2023-02-03 NOTE — PROGRESS NOTES
Physical Therapy Initial Evaluation  Subjective:  The history is provided by the patient. No  was used.   Patient Health History  Merlin J Weinhold being seen for back pain from PMR.     Date of Onset: 2/1/23 date of order.      Pain is reported as 4/10 on pain scale.  General health as reported by patient is good.  Health conditions: PMR.   Red flags:  None as reported by patient.      Other surgery history details: hernia repair in january of 2023.    Current medications:  Pain medication.    Current occupation is Retired.   Primary job tasks include:  Repetitive tasks, lifting/carrying, driving and prolonged sitting.                  Therapist Generated HPI Evaluation  Problem details: He has been having upper back pain for a year and a half or so. He went to [facility] for physical therapy in Fall of 2021 and his back got better. In January of 2022 his back muscles were very tight and painful. He was then diagnosed with an autoimmune disorder that was contributing to his back pain. He has been on a number of medications. He notes prednisone does help his pain but he cannot stay on the prednisone.    The majority of his pain is right between his shoulder blades. His pain starts by noon and worsens throughout the day. Vicodin does help his pain, but he is trying to wean off of that.    He will stretch his arm, legs, and back for 15-20 minutes in the morning.      He had hernia surgery in January and he stopped performing his exercises for some time and he felt much more stiff..         Type of problem:  Thoracic.    This is a chronic condition.  Condition occurred with:  Other reason and insidious onset.  Where condition occurred: other and for unknown reasons.  Site of Pain: between shoulder blades.  Pain is described as aching and is intermittent.  Pain is worse in the P.M..  Since onset symptoms are unchanged.  Exacerbated by: nothing.  Relieved by: pain medicaiton.      Work activity  restrictions: Retired.  Barriers include:  None as reported by patient.                        Objective:  Standing Alignment:    Cervical/Thoracic:  Forward head  Shoulder/UE:  Protracted scapula L, protracted scapula R, scapular winging L, scapular winging R and rounded shoulders                  Flexibility/Screens:         Spine:  Decreased left spine flexibility:  Upper Trap and Levator    Decreased right spine flexibility:  Upper Trap and Levator                  Cervical/Thoracic Evaluation    AROM:  AROM Cervical:    Flexion:            WNL, pulling back of neck  Extension:       WNL  Rotation:         Left: mild loss     Right: mild loss  Side Bend:      Left: major loss, pulling right side of neck     Right:  Major loss, pulling left side of neck      Headaches: none  Cervical Myotomes:  normal                      Cervical Dermatomes:  normal                    Cervical Palpation:  : thoracic paraspinals.  Tenderness present at Left:    Upper Trap and Levator  Tenderness present at Right:    Upper Trap and Levator               Shoulder Evaluation:  ROM:  AROM:  normal                                                                             General     ROS    Assessment/Plan:    Patient is a 77 year old male with thoracic complaints.    Patient has the following significant findings with corresponding treatment plan.                Diagnosis 1:  Chronic thoracic pain  Pain -  hot/cold therapy, US, electric stimulation, mechanical traction, manual therapy, STS, splint/taping/bracing/orthotics, self management, education, directional preference exercise and home program  Decreased ROM/flexibility - manual therapy, therapeutic exercise, therapeutic activity and home program  Impaired posture - neuro re-education, therapeutic activities and home program    Therapy Evaluation Codes:   Cumulative Therapy Evaluation is: Low complexity.    Previous and current functional limitations:  (See Goal Flow Sheet for  this information)    Short term and Long term goals: (See Goal Flow Sheet for this information)     Communication ability:  Patient appears to be able to clearly communicate and understand verbal and written communication and follow directions correctly.  Treatment Explanation - The following has been discussed with the patient:   RX ordered/plan of care  Anticipated outcomes  Possible risks and side effects  This patient would benefit from PT intervention to resume normal activities.   Rehab potential is good.    Frequency:  1 X week, once daily  Duration:  for 8 weeks  Discharge Plan:  Achieve all LTG.  Independent in home treatment program.  Reach maximal therapeutic benefit.    Please refer to the daily flowsheet for treatment today, total treatment time and time spent performing 1:1 timed codes.

## 2023-02-07 ENCOUNTER — THERAPY VISIT (OUTPATIENT)
Dept: PHYSICAL THERAPY | Facility: CLINIC | Age: 78
End: 2023-02-07
Payer: COMMERCIAL

## 2023-02-07 DIAGNOSIS — M54.6 CHRONIC BILATERAL THORACIC BACK PAIN: Primary | ICD-10-CM

## 2023-02-07 DIAGNOSIS — G89.29 CHRONIC BILATERAL THORACIC BACK PAIN: Primary | ICD-10-CM

## 2023-02-07 PROCEDURE — 97140 MANUAL THERAPY 1/> REGIONS: CPT | Mod: GP

## 2023-02-07 PROCEDURE — 97110 THERAPEUTIC EXERCISES: CPT | Mod: GP

## 2023-02-13 ENCOUNTER — THERAPY VISIT (OUTPATIENT)
Dept: PHYSICAL THERAPY | Facility: CLINIC | Age: 78
End: 2023-02-13
Payer: COMMERCIAL

## 2023-02-13 DIAGNOSIS — M54.6 CHRONIC BILATERAL THORACIC BACK PAIN: Primary | ICD-10-CM

## 2023-02-13 DIAGNOSIS — G89.29 CHRONIC BILATERAL THORACIC BACK PAIN: Primary | ICD-10-CM

## 2023-02-13 PROCEDURE — 97140 MANUAL THERAPY 1/> REGIONS: CPT | Mod: GP | Performed by: PHYSICAL THERAPIST

## 2023-02-13 PROCEDURE — 97110 THERAPEUTIC EXERCISES: CPT | Mod: GP | Performed by: PHYSICAL THERAPIST

## 2023-02-20 ENCOUNTER — THERAPY VISIT (OUTPATIENT)
Dept: PHYSICAL THERAPY | Facility: CLINIC | Age: 78
End: 2023-02-20
Attending: INTERNAL MEDICINE
Payer: COMMERCIAL

## 2023-02-20 DIAGNOSIS — M54.6 CHRONIC BILATERAL THORACIC BACK PAIN: Primary | ICD-10-CM

## 2023-02-20 DIAGNOSIS — G89.29 CHRONIC BILATERAL THORACIC BACK PAIN: Primary | ICD-10-CM

## 2023-02-20 PROCEDURE — 97140 MANUAL THERAPY 1/> REGIONS: CPT | Mod: GP | Performed by: PHYSICAL THERAPIST

## 2023-02-20 PROCEDURE — 97110 THERAPEUTIC EXERCISES: CPT | Mod: GP | Performed by: PHYSICAL THERAPIST

## 2023-02-27 ENCOUNTER — THERAPY VISIT (OUTPATIENT)
Dept: PHYSICAL THERAPY | Facility: CLINIC | Age: 78
End: 2023-02-27
Payer: COMMERCIAL

## 2023-02-27 DIAGNOSIS — M54.6 CHRONIC BILATERAL THORACIC BACK PAIN: Primary | ICD-10-CM

## 2023-02-27 DIAGNOSIS — G89.29 CHRONIC BILATERAL THORACIC BACK PAIN: Primary | ICD-10-CM

## 2023-02-27 PROCEDURE — 97112 NEUROMUSCULAR REEDUCATION: CPT | Mod: GP

## 2023-02-27 PROCEDURE — 97530 THERAPEUTIC ACTIVITIES: CPT | Mod: GP

## 2023-02-27 PROCEDURE — 97110 THERAPEUTIC EXERCISES: CPT | Mod: GP

## 2023-03-06 ENCOUNTER — THERAPY VISIT (OUTPATIENT)
Dept: PHYSICAL THERAPY | Facility: CLINIC | Age: 78
End: 2023-03-06
Payer: COMMERCIAL

## 2023-03-06 DIAGNOSIS — G89.29 CHRONIC BILATERAL THORACIC BACK PAIN: Primary | ICD-10-CM

## 2023-03-06 DIAGNOSIS — M54.6 CHRONIC BILATERAL THORACIC BACK PAIN: Primary | ICD-10-CM

## 2023-03-06 PROCEDURE — 97110 THERAPEUTIC EXERCISES: CPT | Mod: GP

## 2023-03-06 PROCEDURE — 97530 THERAPEUTIC ACTIVITIES: CPT | Mod: GP

## 2023-03-06 NOTE — PROGRESS NOTES
"Discharge Note    Progress reporting period is from initial evaluation date (please see noted date below) to Mar 6, 2023.  Linked Episodes   Type: Episode: Status: Noted: Resolved: Last update: Updated by:   PHYSICAL THERAPY thoracic pain Active 2/3/2023 3/6/23 3/6/2023  9:15 AM Milli Person PT      Comments:     Please see information below for last relevant information on current status.  Patient seen for 6 visits.    SUBJECTIVE  Subjective changes noted by patient:  He reports the back is feeling better overall. A lot of days is noticing longer hours of being pain-free. I think the PT is helping. Has noticed he is taking less pain medication. \"I can tell when a storm is coming\" - both the back and knees will get really stiff & sore. Doing HEP spread out throughout the day. No longer having any upper back or neck pain when sitting, it is more the low back that bothers; he reports that has been going on for years.  .  Current pain level is 3/10.     Previous pain level was  4/10.   Changes in function:  Yes (See Goal flowsheet attached for changes in current functional level)  Adverse reaction to treatment or activity: None    OBJECTIVE  Changes noted in objective findings: Fair carryover of HEP, he forgot about both thoracic extension & standing horiz abduction/retraction. Hypomobile with thoracic rotation feels bow&arrow stretch very intensely. Pt reports feeling independent with home program and would like to self-manage at this time.     ASSESSMENT/PLAN  Diagnosis: chronic thoracic pain   Updated problem list and treatment plan:   Pain - HEP  Decreased ROM/flexibility - HEP  Impaired muscle performance - HEP  STG/LTGs have been met or progress has been made towards goals:  Yes, please see goal flowsheet for most current information  Assessment of Progress: current status is unknown.    Last current status: Pt is progressing well   Self Management Plans:  HEP  I have re-evaluated this patient and find that the " nature, scope, duration and intensity of the therapy is appropriate for the medical condition of the patient.  Merlin continues to require the following intervention to meet STG and LTG's:  HEP.    Recommendations:  Discharge with current home program.  Patient to follow up with MD as needed.    Please refer to the daily flowsheet for treatment today, total treatment time and time spent performing 1:1 timed codes.

## 2023-04-30 ENCOUNTER — HEALTH MAINTENANCE LETTER (OUTPATIENT)
Age: 78
End: 2023-04-30

## 2023-09-19 ENCOUNTER — TRANSCRIBE ORDERS (OUTPATIENT)
Dept: OTHER | Age: 78
End: 2023-09-19

## 2023-09-19 DIAGNOSIS — M25.552 LEFT HIP PAIN: Primary | ICD-10-CM

## 2023-09-19 DIAGNOSIS — G89.29 CHRONIC LEFT SHOULDER PAIN: ICD-10-CM

## 2023-09-19 DIAGNOSIS — M25.512 CHRONIC LEFT SHOULDER PAIN: ICD-10-CM

## 2023-09-25 ENCOUNTER — THERAPY VISIT (OUTPATIENT)
Dept: PHYSICAL THERAPY | Facility: CLINIC | Age: 78
End: 2023-09-25
Payer: COMMERCIAL

## 2023-09-25 DIAGNOSIS — M25.552 HIP PAIN, LEFT: Primary | ICD-10-CM

## 2023-09-25 PROCEDURE — 97161 PT EVAL LOW COMPLEX 20 MIN: CPT | Mod: GP

## 2023-09-25 PROCEDURE — 97110 THERAPEUTIC EXERCISES: CPT | Mod: GP

## 2023-09-25 ASSESSMENT — ACTIVITIES OF DAILY LIVING (ADL)
GOING_DOWN_1_FLIGHT_OF_STAIRS: SLIGHT DIFFICULTY
PUTTING_ON_SOCKS_AND_SHOES: NO DIFFICULTY AT ALL
HOW_WOULD_YOU_RATE_YOUR_CURRENT_LEVEL_OF_FUNCTION_DURING_YOUR_USUAL_ACTIVITIES_OF_DAILY_LIVING_FROM_0_TO_100_WITH_100_BEING_YOUR_LEVEL_OF_FUNCTION_PRIOR_TO_YOUR_HIP_PROBLEM_AND_0_BEING_THE_INABILITY_TO_PERFORM_ANY_OF_YOUR_USUAL_DAILY_ACTIVITIES?: 70
SITTING_FOR_15_MINUTES: NO DIFFICULTY AT ALL
WALKING_15_MINUTES_OR_GREATER: MODERATE DIFFICULTY
LIGHT_TO_MODERATE_WORK: MODERATE DIFFICULTY
GOING_UP_1_FLIGHT_OF_STAIRS: SLIGHT DIFFICULTY
HOS_ADL_COUNT: 14
STANDING_FOR_15_MINUTES: MODERATE DIFFICULTY
HEAVY_WORK: EXTREME DIFFICULTY
HOS_ADL_HIGHEST_POTENTIAL_SCORE: 56
GETTING_INTO_AND_OUT_OF_AN_AVERAGE_CAR: NO DIFFICULTY AT ALL
GETTING_INTO_AND_OUT_OF_A_BATHTUB: NO DIFFICULTY AT ALL
STEPPING_UP_AND_DOWN_CURBS: NO DIFFICULTY AT ALL
WALKING_INITIALLY: SLIGHT DIFFICULTY
ROLLING_OVER_IN_BED: NO DIFFICULTY AT ALL
WALKING_APPROXIMATELY_10_MINUTES: SLIGHT DIFFICULTY
HOS_ADL_SCORE(%): 71.43
HOS_ADL_ITEM_SCORE_TOTAL: 40
TWISTING/PIVOTING_ON_INVOLVED_LEG: MODERATE DIFFICULTY
RECREATIONAL_ACTIVITIES: SLIGHT DIFFICULTY

## 2023-09-25 NOTE — PROGRESS NOTES
PHYSICAL THERAPY EVALUATION  Type of Visit: Evaluation    See electronic medical record for Abuse and Falls Screening details.    Subjective   Patient presents to outpatient physical therapy with complaints of left hip pain. Reports some arthritis in the left hip. Had problems 2 years ago, took an x-ray and MD confirmed OA (or degenerative changes in the joint). Received cortisone inj (Jan 2021) and stated it helped for 9 months. Has since been dx with autoimmune disease (polymyalgia rheumatica) for which he received PT this spring/summer for shoulder pain and improved. Hip progressively got worse throughout the summer - has been slowly increasing in pain consistently over the last month. Primary complaint is patient used to walk 6-8 blocks, now limited to 1-2 blocks d/t L hip pain. Driving and sitting doesn't bother. First few minutes of walking will be stiff but loosens up. 10 minutes of standing still increases pain as well, moving around helps. Also has difficulties with lawn care - walking for an hour straight.      Presenting condition or subjective complaint: left hip  Date of onset: 09/19/23    Relevant medical history: Arthritis; Hearing problems; Migraines or headaches; Osteoarthritis   Dates & types of surgery: Hernia - January 2023    Prior diagnostic imaging/testing results: X-ray     Prior therapy history for the same diagnosis, illness or injury:        Prior Level of Function  Transfers: Independent  Ambulation: Independent  ADL: Independent  IADL: Driving, Housekeeping, Yard work    Living Environment  Social support: With a significant other or spouse   Type of home: 1 level   Stairs to enter the home: Yes 3 Is there a railing: Yes   Ramp:     Stairs inside the home: Yes 10 Is there a railing: Yes   Help at home:    Equipment owned:       Employment: No    Hobbies/Interests:      Patient goals for therapy: long walk       Objective   HIP EVALUATION  PAIN: Pain Level at Rest: 3/10  Pain Level with  Use: 4/10  Pain Location: L posterolateral hip and around to L anterolateral hip/groin  Pain Quality: Aching and Dull  Pain Frequency: intermittent  Pain is Worst: daytime  Pain is Exacerbated By: Movement (in WB), static standing  Pain is Relieved By: NSAIDs and rest  Pain Progression: Worsened  INTEGUMENTARY (edema, incisions): WNL  POSTURE: WNL  GAIT:   Weightbearing Status:  Full  Assistive Device(s): None  Gait Deviations:  Decreased trunk rotation (B)  BALANCE/PROPRIOCEPTION: Single Leg Stance Eyes Open (seconds): R 30 sec.;  WEIGHTBEARING ALIGNMENT: WNL  NON-WEIGHTBEARING ALIGNMENT: WNL   ROM:   Tested and Full unless otherwise noted  (Degrees) Left AROM Left PROM  Right AROM Right PROM   Hip Flexion  Min loss+  Min loss   Hip Extension NT NT NT NT   Hip Abduction NT  NT    Hip Adduction NT NT NT NT   Hip Internal Rotation NT Min loss+  Min loss   Hip External Rotation       Knee Flexion       Knee Extension       Lumbar Side glide Min loss Min loss   Lumbar Flexion    Lumbar Extension    Pain: with end-range PROM in L hip flexion and IR  End feel: L hip flexion and IR - capsular    STRENGTH:   Pain: - none + mild ++ moderate +++ severe  Strength Scale: 0-5/5 Left Right   Hip Flexion 3+ (++) 5   Hip Extension NT NT   Hip Abduction 3+ (++) 4+   Hip Adduction NT NT   Knee Flexion 5 5   Knee Extension 5 5   Dorsiflexion 5 5     SPECIAL TESTS:    Left Right   ONEIL Negative  Negative    FADIR/Labrum/JOSE Positive Negative    SLR Negative  Negative      PALPATION: Point tenderness to L gluteus medius below iliac crest; negative to L greater trochanter  JOINT MOBILITY: not assessed    Assessment & Plan   CLINICAL IMPRESSIONS  Medical Diagnosis: Left hip pain; chronic left shoulder pain    Treatment Diagnosis: Chronic left hip pain   Impression/Assessment: Patient is a 77 year old male with left hip complaints. Upon evaluation, patient presents with signs/symptoms of chronic left hip pain secondary to intra-articular  hip pain and gluteus medius tendinopathy. The following significant findings have been identified: Pain, Decreased ROM/flexibility, and Decreased strength. These impairments interfere with their ability to perform work tasks, household chores, and community mobility as compared to previous level of function.   KEY PT FINDINGS:  1) Moderate pain with resisted L hip abduction and L hip flexion  2) Point tenderness to L gluteus medius  3) Decreased L hip PROM (flexion and IR)      Clinical Decision Making (Complexity):  Clinical Presentation: Stable/Uncomplicated  Clinical Presentation Rationale: based on medical and personal factors listed in PT evaluation  Clinical Decision Making (Complexity): Low complexity    PLAN OF CARE  Treatment Interventions:  Interventions: Manual Therapy, Neuromuscular Re-education, Therapeutic Activity, Therapeutic Exercise    Long Term Goals     PT Goal 1  Goal Identifier: STG - Walking  Goal Description: Patient will be able to walk 4 blocks with 0/10 pain and no rest breaks  Rationale: to maximize safety and independence with performance of ADLs and functional tasks;to maximize safety and independence within the community  Target Date: 11/06/23  PT Goal 2  Goal Identifier: LTG - Walking  Goal Description: Patient will be able to walk 8 blocks with 0/10 pain and no rest breaks  Rationale: to maximize safety and independence with performance of ADLs and functional tasks;to maximize safety and independence within the community  Target Date: 12/23/23      Frequency of Treatment: 2x/month  Duration of Treatment: 12 weeks    Recommended Referrals to Other Professionals:   Education Assessment:   Learner/Method: Patient;Pictures/Video    Risks and benefits of evaluation/treatment have been explained.   Patient/Family/caregiver agrees with Plan of Care.     Evaluation Time:     PT Eval, Low Complexity Minutes (95178): 30     Signing Clinician: Milli Person PT      [facility] [place]  Rehabilitation Services                                                                                   OUTPATIENT PHYSICAL THERAPY      PLAN OF TREATMENT FOR OUTPATIENT REHABILITATION   Patient's Last Name, First Name, M.I. Weinhold,Merlin J YOB: 1945   Provider's Name   [facility]   Medical Record No.  [id number]     Onset Date: 09/19/23  Start of Care Date: 09/25/23     Medical Diagnosis:  Left hip pain; chronic left shoulder pain      PT Treatment Diagnosis:  Chronic left hip pain Plan of Treatment  Frequency/Duration: 2x/month/ 12 weeks    Certification date from 09/25/23 to 12/23/23         See note for plan of treatment details and functional goals     Milli Person, PT                         I CERTIFY THE NEED FOR THESE SERVICES FURNISHED UNDER        THIS PLAN OF TREATMENT AND WHILE UNDER MY CARE .             Physician Signature               Date    X_____________________________________________________                    Referring Provider:  Cristy Connolly      Initial Assessment  See Epic Evaluation- Start of Care Date: 09/25/23

## 2023-10-05 ENCOUNTER — OFFICE VISIT (OUTPATIENT)
Dept: RHEUMATOLOGY | Facility: CLINIC | Age: 78
End: 2023-10-05
Attending: INTERNAL MEDICINE
Payer: COMMERCIAL

## 2023-10-05 ENCOUNTER — LAB (OUTPATIENT)
Dept: LAB | Facility: CLINIC | Age: 78
End: 2023-10-05
Payer: COMMERCIAL

## 2023-10-05 VITALS
HEART RATE: 64 BPM | BODY MASS INDEX: 26.29 KG/M2 | SYSTOLIC BLOOD PRESSURE: 134 MMHG | HEIGHT: 66 IN | DIASTOLIC BLOOD PRESSURE: 78 MMHG | WEIGHT: 163.6 LBS

## 2023-10-05 DIAGNOSIS — Z79.899 ENCOUNTER FOR LONG-TERM (CURRENT) USE OF HIGH-RISK MEDICATION: ICD-10-CM

## 2023-10-05 DIAGNOSIS — M70.62 GREATER TROCHANTERIC BURSITIS OF LEFT HIP: ICD-10-CM

## 2023-10-05 DIAGNOSIS — M35.3 PMR (POLYMYALGIA RHEUMATICA) (H): ICD-10-CM

## 2023-10-05 DIAGNOSIS — M35.3 PMR (POLYMYALGIA RHEUMATICA) (H): Primary | ICD-10-CM

## 2023-10-05 LAB
ALBUMIN SERPL BCG-MCNC: 4.4 G/DL (ref 3.5–5.2)
ALT SERPL W P-5'-P-CCNC: 16 U/L (ref 0–70)
AST SERPL W P-5'-P-CCNC: 25 U/L (ref 0–45)
BASO+EOS+MONOS # BLD AUTO: ABNORMAL 10*3/UL
BASO+EOS+MONOS NFR BLD AUTO: ABNORMAL %
BASOPHILS # BLD AUTO: 0.1 10E3/UL (ref 0–0.2)
BASOPHILS NFR BLD AUTO: 1 %
CREAT SERPL-MCNC: 1.13 MG/DL (ref 0.67–1.17)
CRP SERPL-MCNC: <3 MG/L
EGFRCR SERPLBLD CKD-EPI 2021: 67 ML/MIN/1.73M2
EOSINOPHIL # BLD AUTO: 0.3 10E3/UL (ref 0–0.7)
EOSINOPHIL NFR BLD AUTO: 6 %
ERYTHROCYTE [DISTWIDTH] IN BLOOD BY AUTOMATED COUNT: 12 % (ref 10–15)
ERYTHROCYTE [SEDIMENTATION RATE] IN BLOOD BY WESTERGREN METHOD: 13 MM/HR (ref 0–20)
HCT VFR BLD AUTO: 40 % (ref 40–53)
HGB BLD-MCNC: 13.5 G/DL (ref 13.3–17.7)
IMM GRANULOCYTES # BLD: 0 10E3/UL
IMM GRANULOCYTES NFR BLD: 0 %
LYMPHOCYTES # BLD AUTO: 1.2 10E3/UL (ref 0.8–5.3)
LYMPHOCYTES NFR BLD AUTO: 22 %
MCH RBC QN AUTO: 30.9 PG (ref 26.5–33)
MCHC RBC AUTO-ENTMCNC: 33.8 G/DL (ref 31.5–36.5)
MCV RBC AUTO: 92 FL (ref 78–100)
MONOCYTES # BLD AUTO: 0.6 10E3/UL (ref 0–1.3)
MONOCYTES NFR BLD AUTO: 12 %
NEUTROPHILS # BLD AUTO: 3.3 10E3/UL (ref 1.6–8.3)
NEUTROPHILS NFR BLD AUTO: 59 %
NRBC # BLD AUTO: 0 10E3/UL
NRBC BLD AUTO-RTO: 0 /100
PLATELET # BLD AUTO: 235 10E3/UL (ref 150–450)
RBC # BLD AUTO: 4.37 10E6/UL (ref 4.4–5.9)
WBC # BLD AUTO: 5.5 10E3/UL (ref 4–11)

## 2023-10-05 PROCEDURE — 84450 TRANSFERASE (AST) (SGOT): CPT | Performed by: PATHOLOGY

## 2023-10-05 PROCEDURE — 85652 RBC SED RATE AUTOMATED: CPT | Performed by: PATHOLOGY

## 2023-10-05 PROCEDURE — 82040 ASSAY OF SERUM ALBUMIN: CPT | Performed by: PATHOLOGY

## 2023-10-05 PROCEDURE — 86140 C-REACTIVE PROTEIN: CPT | Performed by: PATHOLOGY

## 2023-10-05 PROCEDURE — 82565 ASSAY OF CREATININE: CPT | Performed by: PATHOLOGY

## 2023-10-05 PROCEDURE — 36415 COLL VENOUS BLD VENIPUNCTURE: CPT | Performed by: PATHOLOGY

## 2023-10-05 PROCEDURE — 84460 ALANINE AMINO (ALT) (SGPT): CPT | Performed by: PATHOLOGY

## 2023-10-05 PROCEDURE — 99214 OFFICE O/P EST MOD 30 MIN: CPT | Performed by: INTERNAL MEDICINE

## 2023-10-05 PROCEDURE — 85025 COMPLETE CBC W/AUTO DIFF WBC: CPT | Performed by: PATHOLOGY

## 2023-10-05 PROCEDURE — G0463 HOSPITAL OUTPT CLINIC VISIT: HCPCS | Performed by: INTERNAL MEDICINE

## 2023-10-05 RX ORDER — HYDROXYCHLOROQUINE SULFATE 200 MG/1
200 TABLET, FILM COATED ORAL 2 TIMES DAILY
Qty: 180 TABLET | Refills: 1 | Status: SHIPPED | OUTPATIENT
Start: 2023-10-05

## 2023-10-05 ASSESSMENT — PAIN SCALES - GENERAL: PAINLEVEL: SEVERE PAIN (6)

## 2023-10-05 NOTE — LETTER
10/5/2023     RE: Merlin J Weinhold  [address] [address]     Dear Colleague,    Thank you for referring your patient, Merlin J Weinhold, to the [facility] [place] at [facility]. Please see a copy of my visit note below.      Outpatient Rheumatology follow-up    Name: Merlin Weinhold    MRN [id number]   Today's date: 10/05/23  Date of last visit: 2/1/2023         Reason for follow-up: PMR   Primary care physician: Cristy Connolly MD             Assessment & Plan:   77 year old male who co-managed by rheumatologist Dr Smith  for PMR on HCQ.    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 Was previously on methylprednisolone 4 mg daily but tapered off around September 2022 with minimal flare in symptoms. Pt continues to have cold hands and feet, especially in the winter, but history is not consistent with Raynaud's, prior TSH in 9/2022 wnl. Labs today which included ESR and CRP are within normal limits.  -Continue HCQ 200mg BID  -Follow-up in 6 months     # Left hip pain consistent with left hip greater trochanteric bursitis   Prior x-rays imaging from 2021 shows moderate degenerative changes of the left hip. History today is more consistent with left  hip bursitis given pinpoint tenderness to the lateral hip on exam and lack of improvement with most recent intra-articular corticosteroid. Will provide referral to sports medicine clinic for hip greater trochanteric bursa injection  - Will provide referral to sports med for left hip bursa injection  - Continue physical therapy     #Chronic cervical/thoracic back pain  Pt reports persistent back pain localized near the scapula on bilateral shoulders, worse with activity. Review of [facility] Records demonstrates an MRI of the cervical spine showing multilevel cervical spondylosis, with moderate to severe  "multilevel foraminal stenosis (see MRI cervical spine from 2/18/2022). His symptoms have remained unchanged since last visit and they have not limited his activities of daily living. Exam today shows no point tenderness along the spine, but some tenderness along the scapula on bilateral shoulders.  - Continue to treat with OTC analgesics as needed    #Positive GEORGINA: Prior serologies from last visit show an GEORGINA of 1:1280 in a homogeneous pattern. SSA, SSB, smith, RNP, dsDNA, C4 are all negative/normal. His C3 is borderline low of which in this setting I am not concerned.  A prior work-up in 2/23/22 showed elevated GEORGINA/chromatin/dsDNA/SSA which I would note was drawn around the time he was admitted to the hospital for severe hyponatremia secondary to a \"cleanse\" prescribed by a naturopath- more on this below. I suspect that his positive antibodies at that time, which can be seen in the setting of medications/supplements etc, and are negative now may have been secondary to his exposure to whatever was in that.    #High risk medication use: Hydroxychloroquine  -Risks and benefits of HCQ discussed to include rash (including severe rash/SJS/TEN), HA, GI upset, hepatoxicity, retinal toxicity, need for yearly screening OCT exam, need for CBC, CMP, ESR, CRP for routine screening drug toxicity labs among others. Patient is agreeable to continue.  -Routine screening drug toxicity labs obtained today as above.  We will continue routine drug toxicity screening labs every 6 months  -No drug toxicity from HCQ identified by review of his most recent labs results drawn today to include CBC with diff, ESR, CRP, AST, ALT, creatinine.    Patient seen and evaluated with attending rheumatologist, Dr Arreguin. I acted as a scribe during this encounter.     Mca Andrews, MS4  [employer]    Staff Addendum:  This patient was interviewed and examined in the presence of the medical student who was acting as a scribe, " and this note personally edited by me reflects our mutual impression. I personally performed the history and physical exam. I personally reviewed available lab and imaging studies.    Silvestre Arreguin MD  Rheumatology       Subjective:   Interval History 10/5/23:  Pt has been doing well overall. His biggest concern today is left hip pain. He was diagnosed with moderate left hip OA in 2021. An initial intraarticular corticosteroid injection years ago provided significant relief. The most recent injection in 4/2023 provided minimal relief. He notes that the pain, localized on the lateral aspect of the left hip, has worsened in the last few weeks. He is following with physical therapy and has had one visit so far.    Otherwise, he has had not PMR flares since our last visit. Upper back pain has remained unchanged. He localizes the pain just inferior to bilateral scapula. This pain usually worsens the day after strenuous activity such as doing yard work. He is able to perform all ADLs without limitation. Treats these symptoms with over the counter topical analgesics. No proximal muscle weakness. No other joint pain, stiffness, or swelling. No morning stiffness. Denies headache, vision changes, or jaw pain. No fevers, chills or weight loss. Continues to have cold intolerance, especially in the winter. He created a device in his basement to divert the heat from fireplace and chimney in the rest of the room to keep him warm. Denies hands changing colors, although wife notes that they can be purple at times.    14 point review of systems collected and negative if not documented above.    Interval History 2/1/2023:  Pt reports he has been doing well since his last visit. Tapered completely off steroids around September of 2022 (unsure of exact date), and perhaps noted a slight increase in upper back pain but otherwise feels symptoms have been controlled since last visit. No issues with ROM, strength in the shoulders, hips  like before, tolerating ADLs well. No morning stiffness. No scalp tenderness, headaches, changes in vision, fevers, chills, night sweats, joint pain, oral lesions. No GI symptoms. Has been tolerating Plaquenil well, no changes in vision and had eye exam with [facility] in December. Biggest concern today is cold intolerance, worse in the hands, but also more generalized in the rest of the body. Pt reports hands feel constantly cold, which is exacerbated by cold exposure when outside. No numbness, paresthesias. No color changes to the digits.     Interval history 8/11/2022   has pain in the upper back in the afternoons, between 3-4pm. Lasts for the rest of the day. Takes vicodin in the afternoon another 8pm. In the AM it is gone. His current dose of steroids is 1 tablet of methylprednisolone 4mg each morning. Has been on this dose for about 10 weeks. Shortly after decreasing to that dose, he noticed that his shoulder symptoms were some worse.  Though not by much. No side effects from HCQ. No GI complaints any longer. Up about 16 pounds. No GCA symptoms. Occasional hip discomfort after a short walk. Able to get up out of a chair without any difficulty. Shoulder ROM is much improved. Overall he is functionally improved from his PMR treatment. He and his wife are pleased with him improvement during this time. No interval infections. no fevers chills night sweats.  No new rashes.  14 point review of systems collected and otherwise negative.    HPI from initial consultation 4/11/2022 Jan/Feb, both shoulders became painful. Could lift his arms above his shoulders. Went to PT for 4 weeks, twice weekly and his ROM inproved, though pain did not resolve. He went back to PT for another 2-3 weeks. And pain still did not resolve. And plan was to continue PT at home and was doing this daily 5x/week. He then had left hip pain as well, and so had injection into left hip then bilateral shoulders as well. Though this only  "lasted for days to weeks. So he had second injection into right shoulder, which again, not much helpful. Then in June, had hyponatremia and was admitted to [facility]. At the end of august was started on prednisone with taper. Medrol dose pack. Did this ultimately 3 times. He had noticeable improvement with each of these. Then due to this improvement he was started on 8mg of medrol. Had noted small/mild stomach pain starting in the summer of 2021. Had small glass of wine in nov which was some worse. Then in January this started to escalate. In Junuary he had also developed significant hives (previously seen in sept when on fluconazole). He had much worsening of stomach pain during this episode of hives. Small amount of eating or tums \"took the edge off\" of his stomach pain. Appetite was much reduced during this period. Pain was so severe that evening that he got up to take advil and had a fall (hit right shoulder/head). Was seen by PCP/given pepcid. Had CT head without bleed.     In June, was taking 6-8 advil per day. From June to Feb was taking 6-8 advil per day. This was with his steroid that was started in august.      Duodenal Ulcer \"benign appearing, but it is a bit unual in that it is completely circumferential with only 2mm of witdt and mild associated narrowing of the lumen\".     Has lost about 55 pounds from Jan 2021 to today. 10 with COVID. 10 with cleanse with natropath. It was on day 8 of this that he ended up in the hospital due to hyponatremia.     No HA. No vision change. No scalp tenderness. No jaw claudication symptoms. No dry cough. Has chills. No fevers/ night sweats. No epistaxis/hemoptysis. Has had canker sores on and off for years (seconary to peanuts). No blood in stool. No chest pain/shortness of breath. Abdominal symptoms above. No red/hot/swollen joints. No new rash. Has ulcers from pressure sores. Slowly healing. These are improving slowly. No symptoms consistent with dactylitis. No " "symptoms consistent with sclerodactyy.     Past Medical History  PVD  Hearing loss  Osteoarthritis in hands    Past Surgical History  Appendix  Cyst removal    Medications  Current Outpatient Medications   Medication    Cholecalciferol (VITAMIN D-3) 125 MCG (5000 UT) TABS    Ferrous Sulfate (IRON PO)    FIBER PO    HYDROcodone-acetaminophen (NORCO) 5-325 MG tablet    hydroxychloroquine (PLAQUENIL) 200 MG tablet    MAGNESIUM PO    Multiple Vitamin (MULTIVITAMIN PO)    Multiple Vitamin (STRESS FORMULA PO)    Multiple Vitamins-Minerals (IMMUNE SUPPORT PO)    Nutritional Supplements (DHEA PO)    Omega-3 Fatty Acids (OMEGA 3 PO)    PROTEIN PO    PROTEIN PO    UNABLE TO FIND    UNABLE TO FIND    UNABLE TO FIND    UNABLE TO FIND    UNABLE TO FIND    UNABLE TO FIND    UNABLE TO FIND    vitamin C (ASCORBIC ACID) 1000 MG TABS    acetylcysteine (N-ACETYL CYSTEINE) 600 MG CAPS capsule    famotidine (PEPCID) 20 MG tablet    methocarbamol (ROBAXIN) 500 MG tablet    methylPREDNISolone (MEDROL DOSEPAK) 4 MG tablet therapy pack    omeprazole (PRILOSEC) 40 MG DR capsule    UNABLE TO FIND    UNABLE TO FIND    UNABLE TO FIND     No current facility-administered medications for this visit.     Allergies     Allergies   Allergen Reactions    Fluconazole Hives, Itching and Rash     Hives like blisters without fluid arms and back and legs and chest       Family History  Strong family history of CA (lung or breast in 4 of 6 of his immediate family)    Social History  Prior smoker, quit 49. No ETOH since nov. No hx of drug use now or in the past.      Objective:   /78   Pulse 64   Ht 1.676 m (5' 6\")   Wt 74.2 kg (163 lb 9.6 oz)   BMI 26.41 kg/m    Physical exam:  GEN: sitting up unassisted, NAD, wife present with him  HEENT: No facial rash, sclera clear, no oral or nasal ulcers, no inflammatory nasal bridge/external ear changes  Abdomen: soft, non tender, not distended, no masses  Extremities: full active and passive ROM of " bilateral shoulders/elbows, wrists. Can make full fist bilaterally, 5/5  strength. Knees/ankles/MTPs unremarkable. No synovitis of these joints. No dactylitis. No digital pitting. No nail changes. No sclerodactyly. Able to stand unassisted without the use of his arms from a seated position. Significant tenderness to palpation on the lateral aspect of the left hip over the left greater trochanteric bursa. No pain anteriorly over the left groin.    Labs:  WBC Count   Date Value Ref Range Status   10/05/2023 5.5 4.0 - 11.0 10e3/uL Final     Hemoglobin   Date Value Ref Range Status   10/05/2023 13.5 13.3 - 17.7 g/dL Final     Platelet Count   Date Value Ref Range Status   10/05/2023 235 150 - 450 10e3/uL Final     Creatinine   Date Value Ref Range Status   10/05/2023 1.13 0.67 - 1.17 mg/dL Final     Lab Results   Component Value Date    ALKPHOS 65 08/11/2022     AST   Date Value Ref Range Status   10/05/2023 25 0 - 45 U/L Final     Comment:     Reference intervals for this test were updated on 6/12/2023 to more accurately reflect our healthy population. There may be differences in the flagging of prior results with similar values performed with this method. Interpretation of those prior results can be made in the context of the updated reference intervals.     Lab Results   Component Value Date    ALT 20 08/11/2022     Erythrocyte Sedimentation Rate   Date Value Ref Range Status   10/05/2023 13 0 - 20 mm/hr Final     CRP Inflammation   Date Value Ref Range Status   08/11/2022 <2.9 0.0 - 8.0 mg/L Final     UA RESULTS:  Recent Labs   Lab Test 04/12/22  1115   COLOR Yellow   APPEARANCE Clear   URINEGLC Negative   URINEBILI Negative   URINEKETONE Negative   SG 1.023   UBLD Negative   URINEPH 5.0   PROTEIN Negative   NITRITE Negative   LEUKEST Negative   RBCU 1   WBCU 1          GEORGINA pattern 1   Date Value Ref Range Status   04/12/2022 Homogeneous  Final     DNA (ds) Antibody   Date Value Ref Range Status   04/12/2022 9.7  <10.0 IU/mL Final     Comment:     Negative     RNP Antibody IgG   Date Value Ref Range Status   04/12/2022 Negative Negative Final     3/25/2022  Serum electrophoresis no paraprotein identified    3/22/2022  Ferritin 563  Creatinine 0.62  AST 24  ALT 20  CRP 4  ESR 21    3/12/2022  WBC 4.3  Hemoglobin 9.5  Platelet 264    2/23/2022  Double-stranded DNA 38  SSA 1.3  Antichromatin antibody greater than 8  RNP negative  M[place]onado negative  SCL 70 -  SSB negative  Billie 1 -  Anticentromere negative  Hep C antibody negative    8/25/2021  Lyme IgG and IgM negative  CK normal at 40  CCP antibody negative    Imaging:  EXAM: CT CHEST WITH CONTRAST; CT ABDOMEN AND PELVIS WITH   CONTRAST     CLINICAL INFORMATION: Weight loss, abdominal pain     TECHNICAL INFORMATION:  The patient was given 3 cups of   water containing a total of 50 mL Omnipaque 300 to drink   prior to the exam.  After IV injection of 100 mL of   Omnipaque 300, axial sections were obtained from the   thoracic inlet through the symphysis pubis.  Reformations   were obtained in the coronal and sagittal planes.  No   immediate complications were seen.       COMPARISON: Ultrasound 8/26/2021     INTERPRETATION:     Chest:     The lungs are clear.  No suspicious pulmonary nodules or   masses.     Heart size is normal; aortic and coronary atherosclerosis   noted.  No pericardial or pleural effusion.     No intrathoracic lymphadenopathy by size criteria.     Abdomen:     The liver, bile ducts, pancreas, spleen, adrenal glands, and   kidneys are within normal limits.  There is cholelithiasis   without evidence of cholecystitis.  Visualized intestines   are unremarkable.     No abdominopelvic lymphadenopathy by size criteria.   Negative for ascites.  There is atherosclerosis of the aorta   and its branches without aneurysm.     Pelvis:     The prostate measures 4.1 x 5.0 cm.     Urinary bladder is partially collapsed but grossly normal.     Musculoskeletal:     No acute or  suspicious bony abnormality.     CONCLUSION:   1. No CT findings to explain the patient's symptoms.   2. Prostatic enlargement.

## 2023-10-05 NOTE — PROGRESS NOTES
Outpatient Rheumatology follow-up    Name: Merlin Weinhold    MRN [id number]   Today's date: 10/05/23  Date of last visit: 2/1/2023         Reason for follow-up: PMR   Primary care physician: Cristy Connolly MD             Assessment & Plan:   77 year old male who co-managed by rheumatologist Dr Smith  for PMR on HCQ.    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 Was previously on methylprednisolone 4 mg daily but tapered off around September 2022 with minimal flare in symptoms. Pt continues to have cold hands and feet, especially in the winter, but history is not consistent with Raynaud's, prior TSH in 9/2022 wnl. Labs today which included ESR and CRP are within normal limits.  -Continue HCQ 200mg BID  -Follow-up in 6 months     # Left hip pain consistent with left hip greater trochanteric bursitis   Prior x-rays imaging from 2021 shows moderate degenerative changes of the left hip. History today is more consistent with left  hip bursitis given pinpoint tenderness to the lateral hip on exam and lack of improvement with most recent intra-articular corticosteroid. Will provide referral to sports medicine clinic for hip greater trochanteric bursa injection  - Will provide referral to sports med for left hip bursa injection  - Continue physical therapy     #Chronic cervical/thoracic back pain  Pt reports persistent back pain localized near the scapula on bilateral shoulders, worse with activity. Review of [facility] Records demonstrates an MRI of the cervical spine showing multilevel cervical spondylosis, with moderate to severe multilevel foraminal stenosis (see MRI cervical spine from 2/18/2022). His symptoms have remained unchanged since last visit and they have not limited his activities of daily living. Exam today shows no point tenderness along the spine, but some tenderness along the scapula on bilateral shoulders.  - Continue to treat with OTC  "analgesics as needed    #Positive GEORGINA: Prior serologies from last visit show an GEORGINA of 1:1280 in a homogeneous pattern. SSA, SSB, smith, RNP, dsDNA, C4 are all negative/normal. His C3 is borderline low of which in this setting I am not concerned.  A prior work-up in 2/23/22 showed elevated GEORGINA/chromatin/dsDNA/SSA which I would note was drawn around the time he was admitted to the hospital for severe hyponatremia secondary to a \"cleanse\" prescribed by a naturopath- more on this below. I suspect that his positive antibodies at that time, which can be seen in the setting of medications/supplements etc, and are negative now may have been secondary to his exposure to whatever was in that.    #High risk medication use: Hydroxychloroquine  -Risks and benefits of HCQ discussed to include rash (including severe rash/SJS/TEN), HA, GI upset, hepatoxicity, retinal toxicity, need for yearly screening OCT exam, need for CBC, CMP, ESR, CRP for routine screening drug toxicity labs among others. Patient is agreeable to continue.  -Routine screening drug toxicity labs obtained today as above.  We will continue routine drug toxicity screening labs every 6 months  -No drug toxicity from HCQ identified by review of his most recent labs results drawn today to include CBC with diff, ESR, CRP, AST, ALT, creatinine.    Patient seen and evaluated with attending rheumatologist, Dr Arreguin. I acted as a scribe during this encounter.     Mac Andrews, MS4  [employer]    Staff Addendum:  This patient was interviewed and examined in the presence of the medical student who was acting as a scribe, and this note personally edited by me reflects our mutual impression. I personally performed the history and physical exam. I personally reviewed available lab and imaging studies.    Silvestre Arreguin MD  Rheumatology       Subjective:   Interval History 10/5/23:  Pt has been doing well overall. His biggest concern today is " left hip pain. He was diagnosed with moderate left hip OA in 2021. An initial intraarticular corticosteroid injection years ago provided significant relief. The most recent injection in 4/2023 provided minimal relief. He notes that the pain, localized on the lateral aspect of the left hip, has worsened in the last few weeks. He is following with physical therapy and has had one visit so far.    Otherwise, he has had not PMR flares since our last visit. Upper back pain has remained unchanged. He localizes the pain just inferior to bilateral scapula. This pain usually worsens the day after strenuous activity such as doing yard work. He is able to perform all ADLs without limitation. Treats these symptoms with over the counter topical analgesics. No proximal muscle weakness. No other joint pain, stiffness, or swelling. No morning stiffness. Denies headache, vision changes, or jaw pain. No fevers, chills or weight loss. Continues to have cold intolerance, especially in the winter. He created a device in his basement to divert the heat from fireplace and chimney in the rest of the room to keep him warm. Denies hands changing colors, although wife notes that they can be purple at times.    14 point review of systems collected and negative if not documented above.      Interval History 2/1/2023:  Pt reports he has been doing well since his last visit. Tapered completely off steroids around September of 2022 (unsure of exact date), and perhaps noted a slight increase in upper back pain but otherwise feels symptoms have been controlled since last visit. No issues with ROM, strength in the shoulders, hips like before, tolerating ADLs well. No morning stiffness. No scalp tenderness, headaches, changes in vision, fevers, chills, night sweats, joint pain, oral lesions. No GI symptoms. Has been tolerating Plaquenil well, no changes in vision and had eye exam with [facility] in December. Biggest concern today is cold  intolerance, worse in the hands, but also more generalized in the rest of the body. Pt reports hands feel constantly cold, which is exacerbated by cold exposure when outside. No numbness, paresthesias. No color changes to the digits.     Interval history 8/11/2022   has pain in the upper back in the afternoons, between 3-4pm. Lasts for the rest of the day. Takes vicodin in the afternoon another 8pm. In the AM it is gone. His current dose of steroids is 1 tablet of methylprednisolone 4mg each morning. Has been on this dose for about 10 weeks. Shortly after decreasing to that dose, he noticed that his shoulder symptoms were some worse.  Though not by much. No side effects from HCQ. No GI complaints any longer. Up about 16 pounds. No GCA symptoms. Occasional hip discomfort after a short walk. Able to get up out of a chair without any difficulty. Shoulder ROM is much improved. Overall he is functionally improved from his PMR treatment. He and his wife are pleased with him improvement during this time. No interval infections. no fevers chills night sweats.  No new rashes.  14 point review of systems collected and otherwise negative.    HPI from initial consultation 4/11/2022 Jan/Feb, both shoulders became painful. Could lift his arms above his shoulders. Went to PT for 4 weeks, twice weekly and his ROM inproved, though pain did not resolve. He went back to PT for another 2-3 weeks. And pain still did not resolve. And plan was to continue PT at home and was doing this daily 5x/week. He then had left hip pain as well, and so had injection into left hip then bilateral shoulders as well. Though this only lasted for days to weeks. So he had second injection into right shoulder, which again, not much helpful. Then in June, had hyponatremia and was admitted to [facility]. At the end of august was started on prednisone with taper. Medrol dose pack. Did this ultimately 3 times. He had noticeable improvement with each of  "these. Then due to this improvement he was started on 8mg of medrol. Had noted small/mild stomach pain starting in the summer of 2021. Had small glass of wine in nov which was some worse. Then in January this started to escalate. In Junuary he had also developed significant hives (previously seen in sept when on fluconazole). He had much worsening of stomach pain during this episode of hives. Small amount of eating or tums \"took the edge off\" of his stomach pain. Appetite was much reduced during this period. Pain was so severe that evening that he got up to take advil and had a fall (hit right shoulder/head). Was seen by PCP/given pepcid. Had CT head without bleed.     In June, was taking 6-8 advil per day. From June to Feb was taking 6-8 advil per day. This was with his steroid that was started in august.      Duodenal Ulcer \"benign appearing, but it is a bit unual in that it is completely circumferential with only 2mm of witdt and mild associated narrowing of the lumen\".     Has lost about 55 pounds from Jan 2021 to today. 10 with COVID. 10 with cleanse with natropath. It was on day 8 of this that he ended up in the hospital due to hyponatremia.     No HA. No vision change. No scalp tenderness. No jaw claudication symptoms. No dry cough. Has chills. No fevers/ night sweats. No epistaxis/hemoptysis. Has had canker sores on and off for years (seconary to peanuts). No blood in stool. No chest pain/shortness of breath. Abdominal symptoms above. No red/hot/swollen joints. No new rash. Has ulcers from pressure sores. Slowly healing. These are improving slowly. No symptoms consistent with dactylitis. No symptoms consistent with sclerodactyy.     Past Medical History  PVD  Hearing loss  Osteoarthritis in hands    Past Surgical History  Appendix  Cyst removal    Medications  Current Outpatient Medications   Medication    Cholecalciferol (VITAMIN D-3) 125 MCG (5000 UT) TABS    Ferrous Sulfate (IRON PO)    FIBER PO    " "HYDROcodone-acetaminophen (NORCO) 5-325 MG tablet    hydroxychloroquine (PLAQUENIL) 200 MG tablet    MAGNESIUM PO    Multiple Vitamin (MULTIVITAMIN PO)    Multiple Vitamin (STRESS FORMULA PO)    Multiple Vitamins-Minerals (IMMUNE SUPPORT PO)    Nutritional Supplements (DHEA PO)    Omega-3 Fatty Acids (OMEGA 3 PO)    PROTEIN PO    PROTEIN PO    UNABLE TO FIND    UNABLE TO FIND    UNABLE TO FIND    UNABLE TO FIND    UNABLE TO FIND    UNABLE TO FIND    UNABLE TO FIND    vitamin C (ASCORBIC ACID) 1000 MG TABS    acetylcysteine (N-ACETYL CYSTEINE) 600 MG CAPS capsule    famotidine (PEPCID) 20 MG tablet    methocarbamol (ROBAXIN) 500 MG tablet    methylPREDNISolone (MEDROL DOSEPAK) 4 MG tablet therapy pack    omeprazole (PRILOSEC) 40 MG DR capsule    UNABLE TO FIND    UNABLE TO FIND    UNABLE TO FIND     No current facility-administered medications for this visit.     Allergies     Allergies   Allergen Reactions    Fluconazole Hives, Itching and Rash     Hives like blisters without fluid arms and back and legs and chest       Family History  Strong family history of CA (lung or breast in 4 of 6 of his immediate family)    Social History  Prior smoker, quit 49. No ETOH since nov. No hx of drug use now or in the past.      Objective:   /78   Pulse 64   Ht 1.676 m (5' 6\")   Wt 74.2 kg (163 lb 9.6 oz)   BMI 26.41 kg/m    Physical exam:  GEN: sitting up unassisted, NAD, wife present with him  HEENT: No facial rash, sclera clear, no oral or nasal ulcers, no inflammatory nasal bridge/external ear changes  Abdomen: soft, non tender, not distended, no masses  Extremities: full active and passive ROM of bilateral shoulders/elbows, wrists. Can make full fist bilaterally, 5/5  strength. Knees/ankles/MTPs unremarkable. No synovitis of these joints. No dactylitis. No digital pitting. No nail changes. No sclerodactyly. Able to stand unassisted without the use of his arms from a seated position. Significant tenderness to " palpation on the lateral aspect of the left hip over the left greater trochanteric bursa. No pain anteriorly over the left groin.    Labs:  WBC Count   Date Value Ref Range Status   10/05/2023 5.5 4.0 - 11.0 10e3/uL Final     Hemoglobin   Date Value Ref Range Status   10/05/2023 13.5 13.3 - 17.7 g/dL Final     Platelet Count   Date Value Ref Range Status   10/05/2023 235 150 - 450 10e3/uL Final     Creatinine   Date Value Ref Range Status   10/05/2023 1.13 0.67 - 1.17 mg/dL Final     Lab Results   Component Value Date    ALKPHOS 65 08/11/2022     AST   Date Value Ref Range Status   10/05/2023 25 0 - 45 U/L Final     Comment:     Reference intervals for this test were updated on 6/12/2023 to more accurately reflect our healthy population. There may be differences in the flagging of prior results with similar values performed with this method. Interpretation of those prior results can be made in the context of the updated reference intervals.     Lab Results   Component Value Date    ALT 20 08/11/2022     Erythrocyte Sedimentation Rate   Date Value Ref Range Status   10/05/2023 13 0 - 20 mm/hr Final     CRP Inflammation   Date Value Ref Range Status   08/11/2022 <2.9 0.0 - 8.0 mg/L Final     UA RESULTS:  Recent Labs   Lab Test 04/12/22  1115   COLOR Yellow   APPEARANCE Clear   URINEGLC Negative   URINEBILI Negative   URINEKETONE Negative   SG 1.023   UBLD Negative   URINEPH 5.0   PROTEIN Negative   NITRITE Negative   LEUKEST Negative   RBCU 1   WBCU 1          GEORGINA pattern 1   Date Value Ref Range Status   04/12/2022 Homogeneous  Final     DNA (ds) Antibody   Date Value Ref Range Status   04/12/2022 9.7 <10.0 IU/mL Final     Comment:     Negative     RNP Antibody IgG   Date Value Ref Range Status   04/12/2022 Negative Negative Final     3/25/2022  Serum electrophoresis no paraprotein identified    3/22/2022  Ferritin 563  Creatinine 0.62  AST 24  ALT 20  CRP 4  ESR 21    3/12/2022  WBC 4.3  Hemoglobin 9.5  Platelet  264    2/23/2022  Double-stranded DNA 38  SSA 1.3  Antichromatin antibody greater than 8  RNP negative  [place]on[place] negative  SCL 70 -  SSB negative  Billie 1 -  Anticentromere negative  Hep C antibody negative    8/25/2021  Lyme IgG and IgM negative  CK normal at 40  CCP antibody negative    Imaging:  EXAM: CT CHEST WITH CONTRAST; CT ABDOMEN AND PELVIS WITH   CONTRAST     CLINICAL INFORMATION: Weight loss, abdominal pain     TECHNICAL INFORMATION:  The patient was given 3 cups of   water containing a total of 50 mL Omnipaque 300 to drink   prior to the exam.  After IV injection of 100 mL of   Omnipaque 300, axial sections were obtained from the   thoracic inlet through the symphysis pubis.  Reformations   were obtained in the coronal and sagittal planes.  No   immediate complications were seen.       COMPARISON: Ultrasound 8/26/2021     INTERPRETATION:     Chest:     The lungs are clear.  No suspicious pulmonary nodules or   masses.     Heart size is normal; aortic and coronary atherosclerosis   noted.  No pericardial or pleural effusion.     No intrathoracic lymphadenopathy by size criteria.     Abdomen:     The liver, bile ducts, pancreas, spleen, adrenal glands, and   kidneys are within normal limits.  There is cholelithiasis   without evidence of cholecystitis.  Visualized intestines   are unremarkable.     No abdominopelvic lymphadenopathy by size criteria.   Negative for ascites.  There is atherosclerosis of the aorta   and its branches without aneurysm.     Pelvis:     The prostate measures 4.1 x 5.0 cm.     Urinary bladder is partially collapsed but grossly normal.     Musculoskeletal:     No acute or suspicious bony abnormality.     CONCLUSION:     1. No CT findings to explain the patient's symptoms.   2. Prostatic enlargement.

## 2023-10-05 NOTE — NURSING NOTE
"Chief Complaint   Patient presents with    RECHECK     Follow up with PMR     /78   Pulse 64   Ht 1.676 m (5' 6\")   Wt 74.2 kg (163 lb 9.6 oz)   BMI 26.41 kg/m    Daina Villa CMA on 10/5/2023 at 8:43 AM    "

## 2023-10-05 NOTE — PATIENT INSTRUCTIONS
1) continue on plaquenil 200mg twice daily  2) labs today on your way out downstairs  3) Follow-up with me in 6 months    Expect a phone call in the next 48 hours to schedule with orthopedics for injection of the left greater trochanteric bursa.

## 2023-10-16 NOTE — PROGRESS NOTES
Sports Medicine Procedure Note    Merlin was seen today for pain.    Diagnoses and all orders for this visit:    Greater trochanteric bursitis of left hip  -     Orthopedic  Referral  -     Large Joint Injection: L greater trochanteric bursa    Polymyalgia rheumatica (H24)        Merlin J Weinhold is a/an 77 year old male who is seen for Corticosteroid injection of left greater trochanteric bursa.  He was referred by Dr. Arreguin, rheumatologist.   Last injection: None    -Greater trochanteric bursa performed in clinic today under ultrasound guidance with immediate improvement in pain postprocedure  - Provided with home exercise program for gluteus medius strengthening   -Continue to follow-up with rheumatology  -Post-injection instructions were provided to the patient  - Discussed that patient could follow-up with me as needed or if he desires another injection, however if he continues to require repeated injections to the greater trochanteric bursa we should probably consider either formal physical therapy or more interventional options such as PRP or Tenex    Return if symptoms worsen or fail to improve.    Large Joint Injection: L greater trochanteric bursa    Date/Time: 10/17/2023 3:01 PM    Performed by: Sayra Nance DO  Authorized by: Sayra Nance DO    Needle Size:  22 G  Guidance: ultrasound    Approach:  Lateral  Location:  Hip      Site:  L greater trochanteric bursa  Medications:  2 mL lidocaine 1 %; 40 mg triamcinolone 40 MG/ML  Outcome:  Tolerated well, no immediate complications  Procedure discussed: discussed risks, benefits, and alternatives    Consent Given by:  Patient  Timeout: timeout called immediately prior to procedure     Ultrasound was used to ensure safe and accurate needle placement and injection. Ultrasound images of the procedure were permanently stored.  1ml of 8.4% Sodium Bicarbonate solution was used to buffer the local numbing agent for today's  injection          Post-Injection Discharge Instructions    You may shower, however avoid swimming, tub baths or hot tubs for 24 hours following your procedure  You may have a mild to moderate increase in pain for a few days following the injection.  The lidocaine (local numbing medicine) will wear off in several hours. It usually takes 3-5 days for the steroid medication to start working although it may take up to 14 days for full effect.   You may use ice packs for 10-15 minutes, 3 to 4 times a day at the injection site for comfort if needed  You may use extra strength Tylenol for pain control if necessary   If you were fasting, you may resume your normal diet and medications after the procedure  If you have diabetes, your blood sugar may be higher than normal for 10-14 days following a steroid injection. Contact your doctor who manages your diabetes if your blood sugar is significantly higher than usual    If you experience any of the following, call Sports Medicine @ [phone] or [phone]  -Fever over 100 degrees F  -Swelling, bleeding, redness, drainage, warmth at the injection site  -New or significant worsening pain        Dr. Sayra Nance, DO  [facility]  Sports Medicine     -----

## 2023-10-17 ENCOUNTER — OFFICE VISIT (OUTPATIENT)
Dept: ORTHOPEDICS | Facility: CLINIC | Age: 78
End: 2023-10-17
Attending: INTERNAL MEDICINE
Payer: COMMERCIAL

## 2023-10-17 VITALS — BODY MASS INDEX: 26.31 KG/M2 | WEIGHT: 163 LBS | SYSTOLIC BLOOD PRESSURE: 132 MMHG | DIASTOLIC BLOOD PRESSURE: 78 MMHG

## 2023-10-17 DIAGNOSIS — M35.3 POLYMYALGIA RHEUMATICA (H): ICD-10-CM

## 2023-10-17 DIAGNOSIS — M70.62 GREATER TROCHANTERIC BURSITIS OF LEFT HIP: Primary | ICD-10-CM

## 2023-10-17 PROCEDURE — 20611 DRAIN/INJ JOINT/BURSA W/US: CPT | Mod: LT | Performed by: STUDENT IN AN ORGANIZED HEALTH CARE EDUCATION/TRAINING PROGRAM

## 2023-10-17 RX ORDER — LIDOCAINE HYDROCHLORIDE 10 MG/ML
2 INJECTION, SOLUTION INFILTRATION; PERINEURAL
Status: SHIPPED | OUTPATIENT
Start: 2023-10-17

## 2023-10-17 RX ORDER — TRIAMCINOLONE ACETONIDE 40 MG/ML
40 INJECTION, SUSPENSION INTRA-ARTICULAR; INTRAMUSCULAR
Status: SHIPPED | OUTPATIENT
Start: 2023-10-17

## 2023-10-17 RX ADMIN — TRIAMCINOLONE ACETONIDE 40 MG: 40 INJECTION, SUSPENSION INTRA-ARTICULAR; INTRAMUSCULAR at 15:01

## 2023-10-17 RX ADMIN — LIDOCAINE HYDROCHLORIDE 2 ML: 10 INJECTION, SOLUTION INFILTRATION; PERINEURAL at 15:01

## 2023-10-17 ASSESSMENT — PAIN SCALES - GENERAL: PAINLEVEL: MODERATE PAIN (4)

## 2023-10-17 NOTE — PATIENT INSTRUCTIONS
Post-Injection Discharge Instructions    You may shower, however avoid swimming, tub baths or hot tubs for 24 hours following your procedure  You may have a mild to moderate increase in pain for a few days following the injection.  The lidocaine (local numbing medicine) will wear off in several hours. It usually takes 3-5 days for the steroid medication to start working although it may take up to 14 days for full effect.   You may use ice packs for 10-15 minutes, 3 to 4 times a day at the injection site for comfort if needed  You may use extra strength Tylenol for pain control if necessary   If you were fasting, you may resume your normal diet and medications after the procedure  If you have diabetes, your blood sugar may be higher than normal for 10-14 days following a steroid injection. Contact your doctor who manages your diabetes if your blood sugar is significantly higher than usual    If you experience any of the following, call Sports Medicine @ [phone] or [phone]  -Fever over 100 degrees F  -Swelling, bleeding, redness, drainage, warmth at the injection site  -New or significant worsening pain     Trochanteric Bursitis / Gluteal Tendinopathy    WHAT IS TROCHANTERIC BURSITIS?    Bursitis is irritation or inflammation of the bursa. A bursa is a fluid-filled sac that acts as a cushion between tendons, bones, and skin. There is a bump on the outer side of the upper/outer part of the thigh bone (femur) called the greater trochanter. The trochanteric bursa is located over the greater trochanter. When this bursa is inflamed it is called trochanteric bursitis.        WHAT IS THE CAUSE?     The trochanteric bursa may be inflamed by a group of muscles or tendons rubbing over the bursa and causing friction against the thigh bone. Your iliotibial band goes from the iliac crest of your pelvis down the outer side of your thigh and attaches just below the knee. A tight iliotibial band can lead to trochanteric  bursitis. Often, however, tendonitis of a muscle called the gluteus medius is the cause. This muscle attaches to your greater trochanter and can cause pain when it is weak and inflamed. This injury can also occur with running, walking, or bicycling, especially when the bicycle seat is too high.    WHAT ARE THE SYMPTOMS?    You have pain on the upper outer area of your thigh or on the side of your hip. The pain is worse when you walk, bicycle, go up or down stairs, or lay on that side. You may have pain when you move your thigh and feel tenderness in the area over the greater trochanter.    HOW IS IT DIAGNOSED?    Your healthcare provider will ask about your symptoms and examine your hip and thigh.    HOW IS IT TREATED?    To treat this condition:    Put an ice pack, gel pack, or package of frozen vegetables wrapped in a cloth on the painful area for up to 20 minutes at a time as needed up to 3 times daily, IF this helps your pain. You can also use topical Voltaren Gel (available over the counter) up to 4 times daily. Avoid laying on the side of pain, especially at night. Your provider may also try an injection into the painful area, but the primary treatment of this condition is strengthening of the outer hip muscles, especially the gluteus medius muscle.     You may need to change your sport or activity to one that does not make your pain worse. For example, you may need to swim instead of running or bicycling. If you are bicycling, you may need to lower your bicycle seat.    A bursa that is only mildly inflamed and has just started to hurt may improve within a few weeks. A bursa that is significantly inflamed and has been painful for a long time may take up to a few months to improve. You need to stop doing the activities that cause pain until your bursa has healed.    HOW CAN I HELP PREVENT TROCHANTERIC BURSITIS?    Trochanteric bursitis is best prevented by warming up properly, keeping your gluteus medius  muscles strong, and stretching the muscles on the outer side of your upper thigh.    STRETCHING EXERCISES  You can do the first 3 stretches to begin stretching the muscles that run along the outside of your hip. You can do the strengthening exercises when the sharp pain lessens.    Gluteal stretch: Lie on your back with both knees bent. Rest the ankle on your injured side over the knee of your other leg. Grasp the thigh of the leg on the uninjured side and pull toward your chest. You will feel a stretch along the buttocks on the injured side and possibly along the outside of your hip. Hold the stretch for 15 to 30 seconds. Repeat 3 times.    Iliotibial band stretch, standing: Cross your uninjured leg in front of the other leg and bend down and reach toward the inside of your back foot. Do not bend your knees. Hold this position for 15 to 30 seconds. Return to the starting position. Repeat 3 times.  Iliotibial band stretch, side-leaning: Stand sideways near a wall with your injured side closest to the wall. Place a hand on the wall for support. Cross the leg farther from the wall over the other leg. Keep the foot closest to the wall flat on the floor. Lean your hips into the wall. Hold the stretch for 15 to 30 seconds. Repeat 3 times.    STRENGTHENING EXERCISES    Straight leg raise: Lie on your back with your legs straight out in front of you. Bend the knee on your uninjured side and place the foot flat on the floor. Tighten the thigh muscle on your injured side and lift your leg about 8 inches off the floor. Keep your leg straight and your thigh muscle tight. Slowly lower your leg back down to the floor. Do 2 sets of 15.    Prone hip extension: Lie on your stomach with your legs straight out behind you. Fold your arms under your head and rest your head on your arms. Draw your belly button in towards your spine and tighten your abdominal muscles. Tighten the buttocks and thigh muscles of the leg on your injured side  and lift the leg off the floor about 8 inches. Keep your leg straight. Hold for 5 seconds. Then lower your leg and relax. Do 2 sets of 15.    Side-lying leg lift: Lie on your uninjured side. Tighten the outer hip muscles on your injured leg and lift that leg 8 to 10 inches (20 to 25 centimeters) away from the other leg. Keep the leg straight and lower it slowly. Do 2 sets of 15.    Wall squat with a ball: Stand with your back, shoulders, and head against a wall. Look straight ahead. Keep your shoulders relaxed and your feet 3 feet (90 centimeters) from the wall and shoulder's width apart. Place a soccer or basketball-sized ball behind your back. Keeping your back against the wall, slowly squat down to a 45-degree angle. Your thighs will not yet be parallel to the floor. Hold this position for 10 seconds and then slowly slide back up the wall. Repeat 10 times. Build up to 2 sets of 15.    Clam exercise: Lie on your uninjured side with your hips and knees bent and feet together. Slowly raise your top leg toward the ceiling while keeping your heels touching each other. Hold for 2 seconds and lower slowly. Do 2 sets of 15 repetitions. When this becomes easy for you, add a resistance band around your thighs to make the exercise more difficult.    Side plank: Lie on your side with your legs, hips, and shoulders in a straight line. Prop yourself up onto your forearm with your elbow directly under your shoulder. Lift your hips off the floor and balance on your forearm and the outside of your foot. Try to hold this position for 15 seconds and then slowly lower your hip to the ground. Switch sides and repeat. Work up to holding for 1 minute. This exercise can be made easier by starting with your knees and hips flexed toward your chest.    The plank: Lie on your stomach resting on our forearms. With your legs straight, lift your hips off the floor until they are in line with your shoulders. Support yourself on your forearms  and toes. Hold this position for 15 seconds. (If this is too difficult, you can modify it by placing your knees on the floor.) Repeat 3 times. Work up to increasing your hold time to 30 to 60 seconds.          If the above treatments fail, you can talk to your provider about more invasive treatment options that can offer long-term relief such as platelet rich plasma injections or TENEX therapy.     Developed by [employer].  Published by [employer].  Copyright  2014 [employer] and/or one of its subsidiaries. All rights reserved. (This handout has been adapted by Dr. Nance)

## 2023-10-17 NOTE — LETTER
10/17/2023         RE: Merlin J Weinhold  [address] [address]        Dear Colleague,    Thank you for referring your patient, Merlin J Weinhold, to the [facility]. Please see a copy of my visit note below.    Sports Medicine Procedure Note    Merlin was seen today for pain.    Diagnoses and all orders for this visit:    Greater trochanteric bursitis of left hip  -     Orthopedic  Referral  -     Large Joint Injection: L greater trochanteric bursa    Polymyalgia rheumatica (H24)        Merlin J Weinhold is a/an 77 year old male who is seen for Corticosteroid injection of left greater trochanteric bursa.  He was referred by Dr. Arreguin, rheumatologist.   Last injection: None    -Greater trochanteric bursa performed in clinic today under ultrasound guidance with immediate improvement in pain postprocedure  - Provided with home exercise program for gluteus medius strengthening   -Continue to follow-up with rheumatology  -Post-injection instructions were provided to the patient  - Discussed that patient could follow-up with me as needed or if he desires another injection, however if he continues to require repeated injections to the greater trochanteric bursa we should probably consider either formal physical therapy or more interventional options such as PRP or Tenex    Return if symptoms worsen or fail to improve.    Large Joint Injection: L greater trochanteric bursa    Date/Time: 10/17/2023 3:01 PM    Performed by: Sayra Nance DO  Authorized by: Sayra Nance DO    Needle Size:  22 G  Guidance: ultrasound    Approach:  Lateral  Location:  Hip      Site:  L greater trochanteric bursa  Medications:  2 mL lidocaine 1 %; 40 mg triamcinolone 40 MG/ML  Outcome:  Tolerated well, no immediate complications  Procedure discussed: discussed risks, benefits, and alternatives    Consent Given by:  Patient  Timeout:  timeout called immediately prior to procedure     Ultrasound was used to ensure safe and accurate needle placement and injection. Ultrasound images of the procedure were permanently stored.  1ml of 8.4% Sodium Bicarbonate solution was used to buffer the local numbing agent for today's injection          Post-Injection Discharge Instructions    You may shower, however avoid swimming, tub baths or hot tubs for 24 hours following your procedure  You may have a mild to moderate increase in pain for a few days following the injection.  The lidocaine (local numbing medicine) will wear off in several hours. It usually takes 3-5 days for the steroid medication to start working although it may take up to 14 days for full effect.   You may use ice packs for 10-15 minutes, 3 to 4 times a day at the injection site for comfort if needed  You may use extra strength Tylenol for pain control if necessary   If you were fasting, you may resume your normal diet and medications after the procedure  If you have diabetes, your blood sugar may be higher than normal for 10-14 days following a steroid injection. Contact your doctor who manages your diabetes if your blood sugar is significantly higher than usual    If you experience any of the following, call Sports Medicine @ [phone] or [phone]  -Fever over 100 degrees F  -Swelling, bleeding, redness, drainage, warmth at the injection site  -New or significant worsening pain        Dr. Sayra Nance, DO  [facility]  Sports Medicine     -----           Again, thank you for allowing me to participate in the care of your patient.        Sincerely,        Sayra Nance, DO

## 2023-10-25 ENCOUNTER — THERAPY VISIT (OUTPATIENT)
Dept: PHYSICAL THERAPY | Facility: CLINIC | Age: 78
End: 2023-10-25
Payer: COMMERCIAL

## 2023-10-25 DIAGNOSIS — M25.552 HIP PAIN, LEFT: Primary | ICD-10-CM

## 2023-10-25 PROCEDURE — 97110 THERAPEUTIC EXERCISES: CPT | Mod: GP

## 2023-10-25 PROCEDURE — 97140 MANUAL THERAPY 1/> REGIONS: CPT | Mod: GP

## 2023-11-02 ENCOUNTER — LAB (OUTPATIENT)
Dept: LAB | Facility: CLINIC | Age: 78
End: 2023-11-02
Payer: COMMERCIAL

## 2023-11-02 DIAGNOSIS — Z79.899 ENCOUNTER FOR LONG-TERM (CURRENT) USE OF HIGH-RISK MEDICATION: ICD-10-CM

## 2023-11-02 DIAGNOSIS — M35.3 PMR (POLYMYALGIA RHEUMATICA) (H): ICD-10-CM

## 2023-11-02 LAB
ALBUMIN SERPL BCG-MCNC: 3.9 G/DL (ref 3.5–5.2)
ALP SERPL-CCNC: 87 U/L (ref 40–129)
ALT SERPL W P-5'-P-CCNC: 13 U/L (ref 0–70)
ANION GAP SERPL CALCULATED.3IONS-SCNC: 8 MMOL/L (ref 7–15)
AST SERPL W P-5'-P-CCNC: 24 U/L (ref 0–45)
BILIRUB SERPL-MCNC: 0.5 MG/DL
BUN SERPL-MCNC: 15 MG/DL (ref 8–23)
CALCIUM SERPL-MCNC: 8.9 MG/DL (ref 8.8–10.2)
CHLORIDE SERPL-SCNC: 95 MMOL/L (ref 98–107)
CREAT SERPL-MCNC: 1.05 MG/DL (ref 0.67–1.17)
CRP SERPL-MCNC: <3 MG/L
DEPRECATED HCO3 PLAS-SCNC: 26 MMOL/L (ref 22–29)
EGFRCR SERPLBLD CKD-EPI 2021: 73 ML/MIN/1.73M2
ERYTHROCYTE [SEDIMENTATION RATE] IN BLOOD BY WESTERGREN METHOD: 10 MM/HR (ref 0–20)
GLUCOSE SERPL-MCNC: 130 MG/DL (ref 70–99)
POTASSIUM SERPL-SCNC: 4 MMOL/L (ref 3.4–5.3)
PROT SERPL-MCNC: 6.6 G/DL (ref 6.4–8.3)
SODIUM SERPL-SCNC: 129 MMOL/L (ref 135–145)

## 2023-11-02 PROCEDURE — 86140 C-REACTIVE PROTEIN: CPT | Performed by: PATHOLOGY

## 2023-11-02 PROCEDURE — 80053 COMPREHEN METABOLIC PANEL: CPT | Performed by: PATHOLOGY

## 2023-11-02 PROCEDURE — 85652 RBC SED RATE AUTOMATED: CPT | Performed by: PATHOLOGY

## 2023-11-02 PROCEDURE — 36415 COLL VENOUS BLD VENIPUNCTURE: CPT | Performed by: PATHOLOGY

## 2023-11-08 ENCOUNTER — THERAPY VISIT (OUTPATIENT)
Dept: PHYSICAL THERAPY | Facility: CLINIC | Age: 78
End: 2023-11-08
Payer: COMMERCIAL

## 2023-11-08 DIAGNOSIS — M25.552 HIP PAIN, LEFT: Primary | ICD-10-CM

## 2023-11-08 PROCEDURE — 97116 GAIT TRAINING THERAPY: CPT | Mod: GP

## 2023-11-08 PROCEDURE — 97110 THERAPEUTIC EXERCISES: CPT | Mod: GP

## 2023-11-10 NOTE — PROGRESS NOTES
ASSESSMENT & PLAN    Merlin was seen today for pain.    Diagnoses and all orders for this visit:    Primary osteoarthritis of left hip  -     Orthopedic  Referral; Future  -     Cane Order for DME - ONLY FOR DME    Greater trochanteric bursitis of left hip  -     XR Pelvis w Hip LT 1 View; Future    Chronic left hip pain  -     XR Pelvis w Hip LT 1 View; Future      78-year-old male presents with chronic left hip pain ongoing for many years.  He has tried injections, with diminishing return and most recent intra-articular injection lasted only a month and a half.  His lateral hip pain has significantly improved since his greater trochanteric bursa injection a few weeks ago.  On x-ray today, he has severe degenerative changes of the left hip joint and on physical exam he has reduced range of motion, with significant pain with scour and logroll consistent with hip osteoarthritis.  As he has failed conservative treatment including physical therapy, injections, and medications, we discussed that I would like to refer him to orthopedic surgery to discuss DONATO.    Plan:  - Prescription provided for a cane today to help offload hip  - Continue PT and working on physical activity as tolerated  - Can take Tylenol extra strength 3 times daily for pain  - Referral to orthopedic surgery provided today for evaluation of hip replacement    Return if symptoms worsen or fail to improve.      Dr. Sayra Nance, DO  [facility]  Sports Medicine     -----  Chief Complaint   Patient presents with    Left Hip - Pain       SUBJECTIVE  Merlin J Weinhold is a/an 78 year old male who is seen in consultation at the request of his rheumatologist for evaluation of L hip pain. He was initially referred for a procedure only appointment for L hip trochanteric bursitis. The injection didn't provide much relief, so he is back for an evaluation. Merlin states that the L hip pain started one month ago. Originally  the pain was lateral, while now the pain is more anterior. Symptoms are worsened by standing and walking; any activity will cause pain. If he sits down, the pain goes away. The longer he is up on his feet, the more the pain intensifies and he then takes Vicodin for it.  He has gone to PT three times now for the L hip pain; he is unsure that it's helping. Associated symptoms include his feet tingle and feet/hands get cold easily.    The patient is seen alone    Prior injury/Surgical history of affected joint: None  Social History/Occupation: Happily retired    REVIEW OF SYSTEMS:  Pertinent positives/negative: As stated above in HPI    OBJECTIVE:  There were no vitals taken for this visit.   General: Alert and in no distress  Skin: no visable rashes  CV: Extremities appear well perfused   Resp: normal respiratory effort, no conversational dyspnea   Psych: normal mood, affect  MSK:  LEFT Hip Exam  Inspection:    No swelling, bruising, discoloration, or obvious deformity   Palpation:    No tenderness to palpation  Active Range of Motion:     Flexion  full / IR limited by tightness limited by pain / ER  limited by tightness limited by pain  Strength:    Flexion 5-/5    Positive: Logroll, anterior impingement (FADIR)    Negative:  passive ER with hip flexion (Drehman's)       RADIOLOGY:  Final results and radiologist's interpretation available in the [facility] health record.  Images below were personally reviewed and discussed with the patient in the office today.  My personal interpretation of the performed imaging: X-ray of the left hip today reveal severe degenerative changes with advanced osteoarthritis

## 2023-11-13 ENCOUNTER — ANCILLARY PROCEDURE (OUTPATIENT)
Dept: GENERAL RADIOLOGY | Facility: CLINIC | Age: 78
End: 2023-11-13
Attending: STUDENT IN AN ORGANIZED HEALTH CARE EDUCATION/TRAINING PROGRAM
Payer: COMMERCIAL

## 2023-11-13 ENCOUNTER — OFFICE VISIT (OUTPATIENT)
Dept: ORTHOPEDICS | Facility: CLINIC | Age: 78
End: 2023-11-13
Payer: COMMERCIAL

## 2023-11-13 DIAGNOSIS — M70.62 GREATER TROCHANTERIC BURSITIS OF LEFT HIP: ICD-10-CM

## 2023-11-13 DIAGNOSIS — M25.552 CHRONIC LEFT HIP PAIN: ICD-10-CM

## 2023-11-13 DIAGNOSIS — M16.12 PRIMARY OSTEOARTHRITIS OF LEFT HIP: Primary | ICD-10-CM

## 2023-11-13 DIAGNOSIS — G89.29 CHRONIC LEFT HIP PAIN: ICD-10-CM

## 2023-11-13 PROCEDURE — 99213 OFFICE O/P EST LOW 20 MIN: CPT | Performed by: STUDENT IN AN ORGANIZED HEALTH CARE EDUCATION/TRAINING PROGRAM

## 2023-11-13 PROCEDURE — 73501 X-RAY EXAM HIP UNI 1 VIEW: CPT | Mod: TC | Performed by: STUDENT IN AN ORGANIZED HEALTH CARE EDUCATION/TRAINING PROGRAM

## 2023-11-13 NOTE — LETTER
11/13/2023         RE: Merlin J Weinhold  [address] [address]        Dear Colleague,    Thank you for referring your patient, Merlin J Weinhold, to the [facility] MEDICINE CLIN[facility]. Please see a copy of my visit note below.    ASSESSMENT & PLAN    Merlin was seen today for pain.    Diagnoses and all orders for this visit:    Primary osteoarthritis of left hip  -     Orthopedic  Referral; Future  -     Cane Order for DME - ONLY FOR DME    Greater trochanteric bursitis of left hip  -     XR Pelvis w Hip LT 1 View; Future    Chronic left hip pain  -     XR Pelvis w Hip LT 1 View; Future      78-year-old male presents with chronic left hip pain ongoing for many years.  He has tried injections, with diminishing return and most recent intra-articular injection lasted only a month and a half.  His lateral hip pain has significantly improved since his greater trochanteric bursa injection a few weeks ago.  On x-ray today, he has severe degenerative changes of the left hip joint and on physical exam he has reduced range of motion, with significant pain with scour and logroll consistent with hip osteoarthritis.  As he has failed conservative treatment including physical therapy, injections, and medications, we discussed that I would like to refer him to orthopedic surgery to discuss DONATO.    Plan:  - Prescription provided for a cane today to help offload hip  - Continue PT and working on physical activity as tolerated  - Can take Tylenol extra strength 3 times daily for pain  - Referral to orthopedic surgery provided today for evaluation of hip replacement    Return if symptoms worsen or fail to improve.      Dr. Sayra Nance, DO  [facility]  Sports Medicine     -----  Chief Complaint   Patient presents with     Left Hip - Pain       SUBJECTIVE  Merlin J Weinhold is a/an 78 year old male who is seen in consultation at the  request of his rheumatologist for evaluation of L hip pain. He was initially referred for a procedure only appointment for L hip trochanteric bursitis. The injection didn't provide much relief, so he is back for an evaluation. Merlin states that the L hip pain started one month ago. Originally the pain was lateral, while now the pain is more anterior. Symptoms are worsened by standing and walking; any activity will cause pain. If he sits down, the pain goes away. The longer he is up on his feet, the more the pain intensifies and he then takes Vicodin for it.  He has gone to PT three times now for the L hip pain; he is unsure that it's helping. Associated symptoms include his feet tingle and feet/hands get cold easily.    The patient is seen alone    Prior injury/Surgical history of affected joint: None  Social History/Occupation: Happily retired    REVIEW OF SYSTEMS:  Pertinent positives/negative: As stated above in HPI    OBJECTIVE:  There were no vitals taken for this visit.   General: Alert and in no distress  Skin: no visable rashes  CV: Extremities appear well perfused   Resp: normal respiratory effort, no conversational dyspnea   Psych: normal mood, affect  MSK:  LEFT Hip Exam  Inspection:    No swelling, bruising, discoloration, or obvious deformity   Palpation:    No tenderness to palpation  Active Range of Motion:     Flexion  full / IR limited by tightness limited by pain / ER  limited by tightness limited by pain  Strength:    Flexion 5-/5    Positive: Logroll, anterior impingement (FADIR)    Negative:  passive ER with hip flexion (Drehman's)       RADIOLOGY:  Final results and radiologist's interpretation available in the [facility] health record.  Images below were personally reviewed and discussed with the patient in the office today.  My personal interpretation of the performed imaging: X-ray of the left hip today reveal severe degenerative changes with advanced osteoarthritis                    Again,  thank you for allowing me to participate in the care of your patient.        Sincerely,        Sayra Nance, DO

## 2023-11-13 NOTE — PATIENT INSTRUCTIONS
Thank you for choosing [facility]!    DR. HAMMOND'S CLINIC LOCATIONS:     [place]  TRIAGE LINE: [phone] [address] [facility] APPOINTMENTS: [phone]   [place] [address] RADIOLOGY: [phone]   (Mondays & Tuesdays) HAND THERAPY: [phone]    PHYSICAL THERAPY: [phone]   [place] BILLING QUESTIONS: [phone] [address] FAX: [phone]   [place] [address]    (Thursdays & Fridays)       -You have severe arthritis of your left hip  -I will refer you to orthopedic surgery for a hip replacement  -Continue to stay active, go to PT, and work on strengthening, as this will help you both overall and help you recover from surgery faster  -You can take XS Tylenol up to three times daily for pain  -Consider using a cane with your right hand to help offload hip, prescription provided today

## 2023-11-14 ENCOUNTER — MYC MEDICAL ADVICE (OUTPATIENT)
Dept: ORTHOPEDICS | Facility: CLINIC | Age: 78
End: 2023-11-14
Payer: COMMERCIAL

## 2023-11-22 ENCOUNTER — THERAPY VISIT (OUTPATIENT)
Dept: PHYSICAL THERAPY | Facility: CLINIC | Age: 78
End: 2023-11-22
Payer: COMMERCIAL

## 2023-11-22 DIAGNOSIS — M25.552 HIP PAIN, LEFT: Primary | ICD-10-CM

## 2023-11-22 PROCEDURE — 97140 MANUAL THERAPY 1/> REGIONS: CPT | Mod: GP

## 2023-11-22 PROCEDURE — 97110 THERAPEUTIC EXERCISES: CPT | Mod: GP

## 2023-11-27 ENCOUNTER — OFFICE VISIT (OUTPATIENT)
Dept: ORTHOPEDICS | Facility: CLINIC | Age: 78
End: 2023-11-27
Attending: STUDENT IN AN ORGANIZED HEALTH CARE EDUCATION/TRAINING PROGRAM
Payer: COMMERCIAL

## 2023-11-27 ENCOUNTER — ANCILLARY PROCEDURE (OUTPATIENT)
Dept: GENERAL RADIOLOGY | Facility: CLINIC | Age: 78
End: 2023-11-27
Attending: ORTHOPAEDIC SURGERY
Payer: COMMERCIAL

## 2023-11-27 VITALS
BODY MASS INDEX: 25.11 KG/M2 | RESPIRATION RATE: 18 BRPM | WEIGHT: 160 LBS | HEIGHT: 67 IN | SYSTOLIC BLOOD PRESSURE: 144 MMHG | DIASTOLIC BLOOD PRESSURE: 66 MMHG

## 2023-11-27 DIAGNOSIS — M16.12 PRIMARY OSTEOARTHRITIS OF LEFT HIP: ICD-10-CM

## 2023-11-27 DIAGNOSIS — M16.12 PRIMARY OSTEOARTHRITIS OF LEFT HIP: Primary | ICD-10-CM

## 2023-11-27 PROCEDURE — 73502 X-RAY EXAM HIP UNI 2-3 VIEWS: CPT | Mod: TC | Performed by: RADIOLOGY

## 2023-11-27 PROCEDURE — 99204 OFFICE O/P NEW MOD 45 MIN: CPT | Performed by: ORTHOPAEDIC SURGERY

## 2023-11-27 NOTE — LETTER
11/27/2023         RE: Merlin J Weinhold  [address] [facility] [address]        Dear Colleague,    Thank you for referring your patient, Merlin J Weinhold, to the [facility]. Please see a copy of my visit note below.      Merlin J Weinhold is a 78 year old male who is seen in consultation at the request of Dr. Sayra Nance for left hip pain.  He has had progressive problems with the left hip for many years.  He has tried injections at least 3 times in the past.  The first 1 worked for many months, but then less duration after that.  He has also had trochanteric bursitis and received an injection of the back.  This helped his lateral pain, but not his groin pain.  He has also had physical therapy going over strengthening and motion exercises.  He has aching shooting pain he rates it 4 out of 10.  He feels it mainly in the groin and somewhat over the lateral hip.  It is worse with standing.  Better if he sits.  He reports the last 3 years have been bad, partially because of the hip and partially with other medical issues.  He had COVID in 2021.  He then developed polymyalgia rheumatica in 2021 and has had significant pain from this.  He is currently on Plaquenil for that.  He has also extensively used hydrocodone.    Previous x-ray showed osteoarthritis of his left hip.  It was not low enough for us to template, so we repeated the x-ray today.  X-ray shows significant osteoarthritis of the left hip.  Left he has about 4 mm shorter than the right.  He has very good bone density.    History reviewed. No pertinent past medical history.    History reviewed. No pertinent surgical history.    History reviewed. No pertinent family history.    Social History     Socioeconomic History     Marital status:      Spouse name: Not on file     Number of children: Not on file     Years of education: Not on file     Highest education level: Not on file   Occupational History      Not on file   Tobacco Use     Smoking status: Former     Types: Cigarettes     Quit date: 1974     Years since quittin.5     Smokeless tobacco: Never   Substance and Sexual Activity     Alcohol use: Not Currently     Drug use: Never     Sexual activity: Not on file   Other Topics Concern     Not on file   Social History Narrative     Not on file     Social Determinants of Health     Financial Resource Strain: Not on file   Food Insecurity: Not on file   Transportation Needs: Not on file   Physical Activity: Not on file   Stress: Not on file   Social Connections: Not on file   Interpersonal Safety: Not on file   Housing Stability: Not on file       Current Outpatient Medications   Medication Sig Dispense Refill     acetylcysteine (N-ACETYL CYSTEINE) 600 MG CAPS capsule Take 1 cap with each meal (Patient not taking: Reported on 2023)       Cholecalciferol (VITAMIN D-3) 125 MCG (5000 UT) TABS Take 1 tablet by mouth daily (Patient not taking: Reported on 10/17/2023)       famotidine (PEPCID) 20 MG tablet Take 20 mg by mouth daily       Ferrous Sulfate (IRON PO) Take 1 tablet by mouth daily NutriDyn Iron Support       FIBER PO Melaleuca Fiberwise Take daily as directed       HYDROcodone-acetaminophen (NORCO) 5-325 MG tablet Take 1 tablet by mouth in the morning and 1 tablet in the evening.       hydroxychloroquine (PLAQUENIL) 200 MG tablet Take 1 tablet (200 mg) by mouth 2 times daily 180 tablet 1     MAGNESIUM PO Take 1 tablet by mouth daily Melaleuca       methocarbamol (ROBAXIN) 500 MG tablet Take 250-500 mg by mouth as needed in the morning and 250-500 mg as needed at noon and 250-500 mg as needed in the evening and 250-500 mg as needed before bedtime for muscle spasms. (Patient not taking: Reported on 2022)       methylPREDNISolone (MEDROL DOSEPAK) 4 MG tablet therapy pack Take 2.5 mg by mouth in the morning. (Patient not taking: Reported on 2023)       Multiple Vitamin (MULTIVITAMIN PO) Take 1  tablet by mouth 2 times daily NutriDyn Essential Minerals       Multiple Vitamin (STRESS FORMULA PO) NutriDyn Stress Essentials Adrenal B1B6 Take 1 tab with each meal       Multiple Vitamins-Minerals (IMMUNE SUPPORT PO) Take 1 tablet by mouth 2 times daily NutriDyn Immune Resilience       Nutritional Supplements (DHEA PO) Bio-Srinivas [place]. 1 spray daily       Omega-3 Fatty Acids (OMEGA 3 PO) Melaleuca Omega 3  Take one cap twice daily       omeprazole (PRILOSEC) 40 MG DR capsule Take 40 mg by mouth in the morning and 40 mg in the evening. (Patient not taking: Reported on 2/1/2023)       PROTEIN PO Melaleuca Protein Drink, one daily       PROTEIN PO Malena Farms Protein Drink, one drink daily       UNABLE TO FIND MEDICATION NAME: NutriDyn Testro Balance Take 1 tab daily       UNABLE TO FIND MEDICATION NAME: NutriDyn Inflam-Dwayne Plus take 1 tab twice daily       UNABLE TO FIND MEDICATION NAME: NutriDyn Zinc-Carnosine Take 1 tab daily       UNABLE TO FIND MEDICATION NAME: NutriDyn Nerve Dwayne Take 1 tab twice daily       UNABLE TO FIND MEDICATION NAME: NutriDyn LipoGen Take 1 tab twice daily       UNABLE TO FIND MEDICATION NAME: Malaleuca Good Zymes Take 1 with each meal       UNABLE TO FIND MEDICATION NAME: Melaleuca Prostavan 1 tab daily       UNABLE TO FIND MEDICATION NAME: Melaleuca Nutraview Take one tab daily       UNABLE TO FIND MEDICATION NAME: Melaleuca Replenex Take 1 tab twice daily       UNABLE TO FIND MEDICATION NAME: Melaleuca Multivitamin Take 1 tab twice daily       vitamin C (ASCORBIC ACID) 1000 MG TABS Take 1,000-2,000 mg by mouth in the morning.         Allergies   Allergen Reactions     Fluconazole Hives, Itching and Rash     Hives like blisters without fluid arms and back and legs and chest       REVIEW OF SYSTEMS:  CONSTITUTIONAL:  NEGATIVE for fever, chills, change in weight, not feeling tired  SKIN:  NEGATIVE for worrisome rashes, no skin lumps, no skin ulcers and no non-healing wounds  EYES:  NEGATIVE  "for vision changes or irritation.  ENT/MOUTH:  NEGATIVE.  No hearing loss, no hoarseness, no difficulty swallowing.  RESP:  NEGATIVE. No cough or shortness of breath.  CV:  NEGATIVE for chest pain, palpitations or peripheral edema  GI:  NEGATIVE for nausea, abdominal pain, heartburn, or change in bowel habits  :  Negative. No dysuria, no hematuria  MUSCULOSKELETAL:  See HPI above  NEURO:  NEGATIVE . No headaches, no dizziness,  no numbness  ENDOCRINE:  NEGATIVE for temperature intolerance, skin/hair changes  HEME/ALLERGY/IMMUNE:  NEGATIVE for bleeding problems  PSYCHIATRIC:  NEGATIVE. no anxiety, no depression.     Exam:  Vitals: BP (!) 144/66   Resp 18   Ht 1.702 m (5' 7\")   Wt 72.6 kg (160 lb)   BMI 25.06 kg/m    BMI= Body mass index is 25.06 kg/m .  Constitutional:  healthy, alert and no distress  Neuro: Alert and Oriented x 3, no focal defects.  Psych: Affect normal   Respiratory: Breathing not labored.  Cardiovascular: normal peripheral pulses  Lymph: no adenopathy  Skin: No rashes,worrisome lesions or skin problems  He has no tenderness across the low back.  He has range of motion on the right of 60 degrees external rotation and 30 degrees internal rotation with no pain.  Range of motion on the left is 60 degrees external rotation and 30 degrees internal rotation, but with significant pain on both of these.  He walks with antalgic gait on the left.  Sensation, motor and circulation are intact.    Assessment:  left hip osteoarthritis - severe by x-ray and symptoms.  He also notes having chronic anemia that has just come up to a hemoglobin of 13.5.  He also has low sodium on his last blood test.  I have instructed him to follow-up with his primary physician about his electrolyte abnormalities.      Plan:  we discussed total hip arthroplasty, specifically with direct anterior approach.  We talked about the patient's condition and diagnosis.  Because of severe arthritis, and failure of conservative measures, " I have suggested left total hip arthroplasty with direct anterior approach.   I've talked in depth about the procedure and the risks, which include, but are not limited to blood loss requiring transfusion, infection, neurovascular injury, deep vein thrombosis, pulmonary embolis, continued pain, numbness around the wound, dislocation, loosening, wear, leg length discrepancy, anesthetic risks, and the possibility of needing additional procedures.  We also talked about recovery time, which differs from patient to patient.  Preop xrays were ordered, and medical clearance will need to be obtained.        Special considerations: direct anterior approach.  4 mms short.    They do not want transfusion after surgery as they do not want to take the chance of receiving blood from somebody who has been vaccinated.  I have asked them to follow-up with her primary physician to continue to bring their hemoglobin higher.  Currently his hemoglobin is low normal, and more of a buffer would be safer.    They were also concerned about potential metal allergies from a blood test that they had previously.  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  I told him there is not an accurate blood test for metal allergies inside the body.  We do not have an option for doing total hip arthroplasty without using metal components.           Again, thank you for allowing me to participate in the care of your patient.        Sincerely,        Nba Dick MD

## 2023-11-27 NOTE — PATIENT INSTRUCTIONS
Merlin to follow up with Primary Care provider regarding elevated blood pressure.  Also discuss low sodium and low hemoglobin.    Mr. Arreaga and I talked about his condition and diagnosis.  Because of severe arthritis, and failure of conservative measures, we have decided to proceed with  total hip arthroplasty.  This will be done with a direct anterior approach.    We have talked in depth about the procedure and the risks, which include, but are not limited to:  * blood loss possibly requiring transfusion,   * blood clots with possible pulmonary embolism  * risk of dislocation.  There will be no restrictions on bending after surgery.  * infection or wound healing problems  * leg length inequality  * nerve damage.  * vascular circulation problems  * continued pain  * Loosening or wear  * anesthetic risks  * The possibility of needing additional procedures.      We also talked about recovery time, which differs from patient to patient.    Average 1 night in the hospital.  Most people can return home at that point.  Usually Physical Therapy is not required after the hospital.   You will use a walker or cane for comfort postoperative.  You can stop these when you are comfortable.    Preop xrays have been ordered, and medical clearance will need to be obtained.    Preop physical with primary physician is needed within 30 days of the surgery.  Nothing to eat or drink for 8 hours before surgery.  Stop blood thinners 5 days before surgery (aspirin, warfarin, anti-inflammatories).      Surgery schedulers will call you to schedule surgery.     If you don't get a call in the next few days, then call [phone] to schedule for [place].

## 2023-11-27 NOTE — PROGRESS NOTES
Merlin J Weinhold is a 78 year old male who is seen in consultation at the request of Dr. Sayra Nance for left hip pain.  He has had progressive problems with the left hip for many years.  He has tried injections at least 3 times in the past.  The first 1 worked for many months, but then less duration after that.  He has also had trochanteric bursitis and received an injection of the back.  This helped his lateral pain, but not his groin pain.  He has also had physical therapy going over strengthening and motion exercises.  He has aching shooting pain he rates it 4 out of 10.  He feels it mainly in the groin and somewhat over the lateral hip.  It is worse with standing.  Better if he sits.  He reports the last 3 years have been bad, partially because of the hip and partially with other medical issues.  He had COVID in .  He then developed polymyalgia rheumatica in  and has had significant pain from this.  He is currently on Plaquenil for that.  He has also extensively used hydrocodone.    Previous x-ray showed osteoarthritis of his left hip.  It was not low enough for us to template, so we repeated the x-ray today.  X-ray shows significant osteoarthritis of the left hip.  Left he has about 4 mm shorter than the right.  He has very good bone density.    History reviewed. No pertinent past medical history.    History reviewed. No pertinent surgical history.    History reviewed. No pertinent family history.    Social History     Socioeconomic History    Marital status:      Spouse name: Not on file    Number of children: Not on file    Years of education: Not on file    Highest education level: Not on file   Occupational History    Not on file   Tobacco Use    Smoking status: Former     Types: Cigarettes     Quit date: 1974     Years since quittin.5    Smokeless tobacco: Never   Substance and Sexual Activity    Alcohol use: Not Currently    Drug use: Never    Sexual activity: Not on file    Other Topics Concern    Not on file   Social History Narrative    Not on file     Social Determinants of Health     Financial Resource Strain: Not on file   Food Insecurity: Not on file   Transportation Needs: Not on file   Physical Activity: Not on file   Stress: Not on file   Social Connections: Not on file   Interpersonal Safety: Not on file   Housing Stability: Not on file       Current Outpatient Medications   Medication Sig Dispense Refill    acetylcysteine (N-ACETYL CYSTEINE) 600 MG CAPS capsule Take 1 cap with each meal (Patient not taking: Reported on 2/1/2023)      Cholecalciferol (VITAMIN D-3) 125 MCG (5000 UT) TABS Take 1 tablet by mouth daily (Patient not taking: Reported on 10/17/2023)      famotidine (PEPCID) 20 MG tablet Take 20 mg by mouth daily      Ferrous Sulfate (IRON PO) Take 1 tablet by mouth daily NutriDyn Iron Support      FIBER PO Melaleuca Fiberwise Take daily as directed      HYDROcodone-acetaminophen (NORCO) 5-325 MG tablet Take 1 tablet by mouth in the morning and 1 tablet in the evening.      hydroxychloroquine (PLAQUENIL) 200 MG tablet Take 1 tablet (200 mg) by mouth 2 times daily 180 tablet 1    MAGNESIUM PO Take 1 tablet by mouth daily Melaleuca      methocarbamol (ROBAXIN) 500 MG tablet Take 250-500 mg by mouth as needed in the morning and 250-500 mg as needed at noon and 250-500 mg as needed in the evening and 250-500 mg as needed before bedtime for muscle spasms. (Patient not taking: Reported on 8/11/2022)      methylPREDNISolone (MEDROL DOSEPAK) 4 MG tablet therapy pack Take 2.5 mg by mouth in the morning. (Patient not taking: Reported on 2/1/2023)      Multiple Vitamin (MULTIVITAMIN PO) Take 1 tablet by mouth 2 times daily NutriDyn Essential Minerals      Multiple Vitamin (STRESS FORMULA PO) NutriDyn Stress Essentials Adrenal B1B6 Take 1 tab with each meal      Multiple Vitamins-Minerals (IMMUNE SUPPORT PO) Take 1 tablet by mouth 2 times daily NutriDyn Immune Resilience       Nutritional Supplements (DHEA PO) Bio-Srinivas [place]. 1 spray daily      Omega-3 Fatty Acids (OMEGA 3 PO) Melaleuca Omega 3  Take one cap twice daily      omeprazole (PRILOSEC) 40 MG DR capsule Take 40 mg by mouth in the morning and 40 mg in the evening. (Patient not taking: Reported on 2/1/2023)      PROTEIN PO Melaleuca Protein Drink, one daily      PROTEIN PO Malena Farms Protein Drink, one drink daily      UNABLE TO FIND MEDICATION NAME: NutriDyn Testro Balance Take 1 tab daily      UNABLE TO FIND MEDICATION NAME: NutriDyn Inflam-Dwayne Plus take 1 tab twice daily      UNABLE TO FIND MEDICATION NAME: NutriDyn Zinc-Carnosine Take 1 tab daily      UNABLE TO FIND MEDICATION NAME: NutriDyn Nerve Dwayne Take 1 tab twice daily      UNABLE TO FIND MEDICATION NAME: NutriDyn LipoGen Take 1 tab twice daily      UNABLE TO FIND MEDICATION NAME: Malaleuca Good Zymes Take 1 with each meal      UNABLE TO FIND MEDICATION NAME: Melaleuca Prostavan 1 tab daily      UNABLE TO FIND MEDICATION NAME: Melaleuca Nutraview Take one tab daily      UNABLE TO FIND MEDICATION NAME: Melaleuca Replenex Take 1 tab twice daily      UNABLE TO FIND MEDICATION NAME: Melaleuca Multivitamin Take 1 tab twice daily      vitamin C (ASCORBIC ACID) 1000 MG TABS Take 1,000-2,000 mg by mouth in the morning.         Allergies   Allergen Reactions    Fluconazole Hives, Itching and Rash     Hives like blisters without fluid arms and back and legs and chest       REVIEW OF SYSTEMS:  CONSTITUTIONAL:  NEGATIVE for fever, chills, change in weight, not feeling tired  SKIN:  NEGATIVE for worrisome rashes, no skin lumps, no skin ulcers and no non-healing wounds  EYES:  NEGATIVE for vision changes or irritation.  ENT/MOUTH:  NEGATIVE.  No hearing loss, no hoarseness, no difficulty swallowing.  RESP:  NEGATIVE. No cough or shortness of breath.  CV:  NEGATIVE for chest pain, palpitations or peripheral edema  GI:  NEGATIVE for nausea, abdominal pain, heartburn, or change in bowel  "habits  :  Negative. No dysuria, no hematuria  MUSCULOSKELETAL:  See HPI above  NEURO:  NEGATIVE . No headaches, no dizziness,  no numbness  ENDOCRINE:  NEGATIVE for temperature intolerance, skin/hair changes  HEME/ALLERGY/IMMUNE:  NEGATIVE for bleeding problems  PSYCHIATRIC:  NEGATIVE. no anxiety, no depression.     Exam:  Vitals: BP (!) 144/66   Resp 18   Ht 1.702 m (5' 7\")   Wt 72.6 kg (160 lb)   BMI 25.06 kg/m    BMI= Body mass index is 25.06 kg/m .  Constitutional:  healthy, alert and no distress  Neuro: Alert and Oriented x 3, no focal defects.  Psych: Affect normal   Respiratory: Breathing not labored.  Cardiovascular: normal peripheral pulses  Lymph: no adenopathy  Skin: No rashes,worrisome lesions or skin problems  He has no tenderness across the low back.  He has range of motion on the right of 60 degrees external rotation and 30 degrees internal rotation with no pain.  Range of motion on the left is 60 degrees external rotation and 30 degrees internal rotation, but with significant pain on both of these.  He walks with antalgic gait on the left.  Sensation, motor and circulation are intact.    Assessment:  left hip osteoarthritis - severe by x-ray and symptoms.  He also notes having chronic anemia that has just come up to a hemoglobin of 13.5.  He also has low sodium on his last blood test.  I have instructed him to follow-up with his primary physician about his electrolyte abnormalities.      Plan:  we discussed total hip arthroplasty, specifically with direct anterior approach.  We talked about the patient's condition and diagnosis.  Because of severe arthritis, and failure of conservative measures, I have suggested left total hip arthroplasty with direct anterior approach.   I've talked in depth about the procedure and the risks, which include, but are not limited to blood loss requiring transfusion, infection, neurovascular injury, deep vein thrombosis, pulmonary embolis, continued pain, " numbness around the wound, dislocation, loosening, wear, leg length discrepancy, anesthetic risks, and the possibility of needing additional procedures.  We also talked about recovery time, which differs from patient to patient.  Preop xrays were ordered, and medical clearance will need to be obtained.        Special considerations: direct anterior approach.  4 mms short.    They do not want transfusion after surgery as they do not want to take the chance of receiving blood from somebody who has been vaccinated.  I have asked them to follow-up with her primary physician to continue to bring their hemoglobin higher.  Currently his hemoglobin is low normal, and more of a buffer would be safer.    They were also concerned about potential metal allergies from a blood test that they had previously.  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  I told him there is not an accurate blood test for metal allergies inside the body.  We do not have an option for doing total hip arthroplasty without using metal components.

## 2023-11-28 ENCOUNTER — TELEPHONE (OUTPATIENT)
Dept: ORTHOPEDICS | Facility: CLINIC | Age: 78
End: 2023-11-28
Payer: COMMERCIAL

## 2023-11-28 NOTE — TELEPHONE ENCOUNTER
Type of surgery: ARTHROPLASTY, HIP, TOTAL, DIRECT ANTERIOR APPROACH (Left)  Location of surgery: [facility]  Date and time of surgery: 1/24  Surgeon: Kapil   Pre-Op Appt Date: They will sched   Post-Op Appt Date: 2/12   Packet sent out: Yes  Pre-cert/Authorization completed:  Not Applicable  Date:

## 2023-12-05 ENCOUNTER — TELEPHONE (OUTPATIENT)
Dept: RHEUMATOLOGY | Facility: CLINIC | Age: 78
End: 2023-12-05
Payer: COMMERCIAL

## 2023-12-05 NOTE — TELEPHONE ENCOUNTER
Calling to reschedule 2/2/24 & 4/11 appts as Dr. Arreguin is leaving the organization. Reschedule with Dr. Alicia in [place] or Dr. Huerta in [facility] by calling [phone].

## 2024-01-10 PROBLEM — Z96.642 HISTORY OF TOTAL LEFT HIP REPLACEMENT: Status: ACTIVE | Noted: 2024-01-10

## 2024-01-10 PROBLEM — M16.12 PRIMARY OSTEOARTHRITIS OF LEFT HIP: Status: ACTIVE | Noted: 2023-11-27

## 2024-01-15 ENCOUNTER — MYC MEDICAL ADVICE (OUTPATIENT)
Dept: ORTHOPEDICS | Facility: CLINIC | Age: 79
End: 2024-01-15
Payer: COMMERCIAL

## 2024-01-18 NOTE — TELEPHONE ENCOUNTER
Called and talked with patient discussing concerns below. They greatly appreciated the call.     Sary Padron MS, ATC  Certified Athletic Trainer

## 2024-01-19 NOTE — PROVIDER NOTIFICATION
01/19/24 5895   Discharge Planning   Patient/Family Anticipates Transition to home with family   Concerns to be Addressed all concerns addressed in this encounter   Living Arrangements   People in Home spouse   Type of Residence Private Residence   Is your private residence a single family home or apartment? Single family home   Number of Stairs, Within Home, Primary three   Stair Railings, Within Home, Primary railings safe and in good condition;railing on right side (ascending)   Once home, are you able to live on one level? Yes   Which level? Main Level   Bathroom Shower/Tub Tub/Shower unit   Equipment Currently Used at Home cane, straight   Support System   Support Systems Spouse/Significant Other   Do you have someone available to stay with you one or two nights once you are home? Yes

## 2024-01-24 ENCOUNTER — HOSPITAL ENCOUNTER (OUTPATIENT)
Facility: CLINIC | Age: 79
Discharge: HOME | End: 2024-01-25
Attending: ORTHOPAEDIC SURGERY | Admitting: ORTHOPAEDIC SURGERY
Payer: COMMERCIAL

## 2024-01-24 ENCOUNTER — ANESTHESIA EVENT (OUTPATIENT)
Dept: SURGERY | Facility: CLINIC | Age: 79
End: 2024-01-24
Payer: COMMERCIAL

## 2024-01-24 ENCOUNTER — HOSPITAL ENCOUNTER (OUTPATIENT)
Dept: GENERAL RADIOLOGY | Facility: CLINIC | Age: 79
Discharge: HOME | End: 2024-01-24
Attending: ORTHOPAEDIC SURGERY | Admitting: ORTHOPAEDIC SURGERY
Payer: COMMERCIAL

## 2024-01-24 ENCOUNTER — APPOINTMENT (OUTPATIENT)
Dept: GENERAL RADIOLOGY | Facility: CLINIC | Age: 79
End: 2024-01-24
Attending: ORTHOPAEDIC SURGERY
Payer: COMMERCIAL

## 2024-01-24 ENCOUNTER — ANESTHESIA (OUTPATIENT)
Dept: SURGERY | Facility: CLINIC | Age: 79
End: 2024-01-24
Payer: COMMERCIAL

## 2024-01-24 ENCOUNTER — APPOINTMENT (OUTPATIENT)
Dept: PHYSICAL THERAPY | Facility: CLINIC | Age: 79
End: 2024-01-24
Attending: ORTHOPAEDIC SURGERY
Payer: COMMERCIAL

## 2024-01-24 DIAGNOSIS — Z96.642 HISTORY OF TOTAL LEFT HIP REPLACEMENT: Primary | ICD-10-CM

## 2024-01-24 DIAGNOSIS — M16.12 PRIMARY OSTEOARTHRITIS OF LEFT HIP: ICD-10-CM

## 2024-01-24 PROBLEM — Z96.649 S/P TOTAL HIP ARTHROPLASTY: Status: ACTIVE | Noted: 2024-01-24

## 2024-01-24 PROCEDURE — 258N000003 HC RX IP 258 OP 636: Performed by: NURSE ANESTHETIST, CERTIFIED REGISTERED

## 2024-01-24 PROCEDURE — 258N000003 HC RX IP 258 OP 636: Performed by: ORTHOPAEDIC SURGERY

## 2024-01-24 PROCEDURE — C1776 JOINT DEVICE (IMPLANTABLE): HCPCS | Performed by: ORTHOPAEDIC SURGERY

## 2024-01-24 PROCEDURE — C1713 ANCHOR/SCREW BN/BN,TIS/BN: HCPCS | Performed by: ORTHOPAEDIC SURGERY

## 2024-01-24 PROCEDURE — 250N000013 HC RX MED GY IP 250 OP 250 PS 637: Performed by: ORTHOPAEDIC SURGERY

## 2024-01-24 PROCEDURE — 999N000063 XR PELVIS PORT 1/2 VIEWS

## 2024-01-24 PROCEDURE — 250N000009 HC RX 250: Performed by: ORTHOPAEDIC SURGERY

## 2024-01-24 PROCEDURE — 999N000141 HC STATISTIC PRE-PROCEDURE NURSING ASSESSMENT: Performed by: ORTHOPAEDIC SURGERY

## 2024-01-24 PROCEDURE — 250N000011 HC RX IP 250 OP 636: Performed by: ORTHOPAEDIC SURGERY

## 2024-01-24 PROCEDURE — 258N000001 HC RX 258: Performed by: ORTHOPAEDIC SURGERY

## 2024-01-24 PROCEDURE — 97110 THERAPEUTIC EXERCISES: CPT | Mod: GP | Performed by: PHYSICAL THERAPIST

## 2024-01-24 PROCEDURE — 272N000001 HC OR GENERAL SUPPLY STERILE: Performed by: ORTHOPAEDIC SURGERY

## 2024-01-24 PROCEDURE — 250N000011 HC RX IP 250 OP 636: Performed by: NURSE ANESTHETIST, CERTIFIED REGISTERED

## 2024-01-24 PROCEDURE — 27130 TOTAL HIP ARTHROPLASTY: CPT | Mod: LT | Performed by: ORTHOPAEDIC SURGERY

## 2024-01-24 PROCEDURE — 250N000009 HC RX 250: Performed by: NURSE ANESTHETIST, CERTIFIED REGISTERED

## 2024-01-24 PROCEDURE — 999N000111 HC STATISTIC OT IP EVAL DEFER: Performed by: SPECIALIST/TECHNOLOGIST

## 2024-01-24 PROCEDURE — 360N000084 HC SURGERY LEVEL 4 W/ FLUORO, PER MIN: Performed by: ORTHOPAEDIC SURGERY

## 2024-01-24 PROCEDURE — 999N000179 XR SURGERY CARM FLUORO LESS THAN 5 MIN W STILLS: Mod: TC

## 2024-01-24 PROCEDURE — 97161 PT EVAL LOW COMPLEX 20 MIN: CPT | Mod: GP | Performed by: PHYSICAL THERAPIST

## 2024-01-24 PROCEDURE — 27130 TOTAL HIP ARTHROPLASTY: CPT | Mod: AS | Performed by: PHYSICIAN ASSISTANT

## 2024-01-24 PROCEDURE — 250N000025 HC SEVOFLURANE, PER MIN: Performed by: ORTHOPAEDIC SURGERY

## 2024-01-24 PROCEDURE — 370N000017 HC ANESTHESIA TECHNICAL FEE, PER MIN: Performed by: ORTHOPAEDIC SURGERY

## 2024-01-24 PROCEDURE — 710N000010 HC RECOVERY PHASE 1, LEVEL 2, PER MIN: Performed by: ORTHOPAEDIC SURGERY

## 2024-01-24 DEVICE — IMPLANTABLE DEVICE: Type: IMPLANTABLE DEVICE | Site: FEMUR | Status: FUNCTIONAL

## 2024-01-24 DEVICE — IMP SCR ZIM 6.5X30MM ACET CUP SELF TAP 00-6250-065-30: Type: IMPLANTABLE DEVICE | Site: ACETABULUM | Status: FUNCTIONAL

## 2024-01-24 DEVICE — IMP HEAD FEMORAL BIOM MOD 36MM -3  11-363661: Type: IMPLANTABLE DEVICE | Site: FEMUR | Status: FUNCTIONAL

## 2024-01-24 DEVICE — IMP SHELL BIOM G7 ACETAB PPS LIM HOLE 60MM SZ G 010000667: Type: IMPLANTABLE DEVICE | Site: ACETABULUM | Status: FUNCTIONAL

## 2024-01-24 DEVICE — IMP SCR ZIM 6.5X40MM ACET CUP SELF TAP 00-6250-065-40: Type: IMPLANTABLE DEVICE | Site: ACETABULUM | Status: FUNCTIONAL

## 2024-01-24 DEVICE — IMPLANTABLE DEVICE: Type: IMPLANTABLE DEVICE | Site: ACETABULUM | Status: FUNCTIONAL

## 2024-01-24 RX ORDER — ASPIRIN 325 MG
325 TABLET, DELAYED RELEASE (ENTERIC COATED) ORAL 2 TIMES DAILY
Qty: 80 TABLET | Refills: 0 | Status: SHIPPED | OUTPATIENT
Start: 2024-01-24 | End: 2024-03-04

## 2024-01-24 RX ORDER — SODIUM CHLORIDE, SODIUM LACTATE, POTASSIUM CHLORIDE, CALCIUM CHLORIDE 600; 310; 30; 20 MG/100ML; MG/100ML; MG/100ML; MG/100ML
INJECTION, SOLUTION INTRAVENOUS CONTINUOUS
Status: DISCONTINUED | OUTPATIENT
Start: 2024-01-24 | End: 2024-01-24 | Stop reason: HOSPADM

## 2024-01-24 RX ORDER — OMEGA-3 FATTY ACIDS/FISH OIL 300-1000MG
400 CAPSULE ORAL EVERY 6 HOURS PRN
COMMUNITY

## 2024-01-24 RX ORDER — ONDANSETRON 4 MG/1
4 TABLET, ORALLY DISINTEGRATING ORAL EVERY 30 MIN PRN
Status: DISCONTINUED | OUTPATIENT
Start: 2024-01-24 | End: 2024-01-24 | Stop reason: HOSPADM

## 2024-01-24 RX ORDER — FENTANYL CITRATE-0.9 % NACL/PF 10 MCG/ML
PLASTIC BAG, INJECTION (ML) INTRAVENOUS PRN
Status: DISCONTINUED | OUTPATIENT
Start: 2024-01-24 | End: 2024-01-24

## 2024-01-24 RX ORDER — LIDOCAINE 40 MG/G
CREAM TOPICAL
Status: DISCONTINUED | OUTPATIENT
Start: 2024-01-24 | End: 2024-01-24 | Stop reason: HOSPADM

## 2024-01-24 RX ORDER — CELECOXIB 100 MG/1
400 CAPSULE ORAL ONCE
Status: COMPLETED | OUTPATIENT
Start: 2024-01-24 | End: 2024-01-24

## 2024-01-24 RX ORDER — OXYCODONE HYDROCHLORIDE 5 MG/1
5-10 TABLET ORAL EVERY 4 HOURS PRN
Qty: 26 TABLET | Refills: 0 | Status: SHIPPED | OUTPATIENT
Start: 2024-01-24 | End: 2024-01-29

## 2024-01-24 RX ORDER — KETOROLAC TROMETHAMINE 15 MG/ML
15 INJECTION, SOLUTION INTRAMUSCULAR; INTRAVENOUS EVERY 6 HOURS
Qty: 4 ML | Refills: 0 | Status: COMPLETED | OUTPATIENT
Start: 2024-01-24 | End: 2024-01-25

## 2024-01-24 RX ORDER — AMOXICILLIN 250 MG
1 CAPSULE ORAL 2 TIMES DAILY
Status: DISCONTINUED | OUTPATIENT
Start: 2024-01-24 | End: 2024-01-25 | Stop reason: HOSPADM

## 2024-01-24 RX ORDER — OXYCODONE HCL 10 MG/1
10 TABLET, FILM COATED, EXTENDED RELEASE ORAL EVERY 8 HOURS
Status: COMPLETED | OUTPATIENT
Start: 2024-01-24 | End: 2024-01-24

## 2024-01-24 RX ORDER — PROPOFOL 10 MG/ML
INJECTION, EMULSION INTRAVENOUS PRN
Status: DISCONTINUED | OUTPATIENT
Start: 2024-01-24 | End: 2024-01-24

## 2024-01-24 RX ORDER — NALOXONE HYDROCHLORIDE 0.4 MG/ML
0.2 INJECTION, SOLUTION INTRAMUSCULAR; INTRAVENOUS; SUBCUTANEOUS
Status: DISCONTINUED | OUTPATIENT
Start: 2024-01-24 | End: 2024-01-25 | Stop reason: HOSPADM

## 2024-01-24 RX ORDER — TRANEXAMIC ACID 650 MG/1
1950 TABLET ORAL ONCE
Status: COMPLETED | OUTPATIENT
Start: 2024-01-24 | End: 2024-01-24

## 2024-01-24 RX ORDER — PROCHLORPERAZINE MALEATE 5 MG
5 TABLET ORAL EVERY 6 HOURS PRN
Status: DISCONTINUED | OUTPATIENT
Start: 2024-01-24 | End: 2024-01-25 | Stop reason: HOSPADM

## 2024-01-24 RX ORDER — ACETAMINOPHEN 500 MG
500-1000 TABLET ORAL EVERY 6 HOURS PRN
COMMUNITY

## 2024-01-24 RX ORDER — POLYETHYLENE GLYCOL 3350 17 G/17G
17 POWDER, FOR SOLUTION ORAL DAILY
Status: DISCONTINUED | OUTPATIENT
Start: 2024-01-25 | End: 2024-01-25 | Stop reason: HOSPADM

## 2024-01-24 RX ORDER — OXYCODONE HYDROCHLORIDE 5 MG/1
10 TABLET ORAL EVERY 4 HOURS PRN
Status: DISCONTINUED | OUTPATIENT
Start: 2024-01-24 | End: 2024-01-25 | Stop reason: HOSPADM

## 2024-01-24 RX ORDER — CEFAZOLIN SODIUM 1 G/3ML
1 INJECTION, POWDER, FOR SOLUTION INTRAMUSCULAR; INTRAVENOUS EVERY 8 HOURS
Qty: 10 ML | Refills: 0 | Status: COMPLETED | OUTPATIENT
Start: 2024-01-24 | End: 2024-01-25

## 2024-01-24 RX ORDER — CEFAZOLIN SODIUM/WATER 2 G/20 ML
2 SYRINGE (ML) INTRAVENOUS
Status: COMPLETED | OUTPATIENT
Start: 2024-01-24 | End: 2024-01-24

## 2024-01-24 RX ORDER — HYDROXYZINE HYDROCHLORIDE 10 MG/1
10 TABLET, FILM COATED ORAL EVERY 6 HOURS PRN
Status: DISCONTINUED | OUTPATIENT
Start: 2024-01-24 | End: 2024-01-25 | Stop reason: HOSPADM

## 2024-01-24 RX ORDER — ONDANSETRON 2 MG/ML
4 INJECTION INTRAMUSCULAR; INTRAVENOUS EVERY 30 MIN PRN
Status: DISCONTINUED | OUTPATIENT
Start: 2024-01-24 | End: 2024-01-24 | Stop reason: HOSPADM

## 2024-01-24 RX ORDER — FENTANYL CITRATE 50 UG/ML
50 INJECTION, SOLUTION INTRAMUSCULAR; INTRAVENOUS EVERY 5 MIN PRN
Status: DISCONTINUED | OUTPATIENT
Start: 2024-01-24 | End: 2024-01-24 | Stop reason: HOSPADM

## 2024-01-24 RX ORDER — FENTANYL CITRATE 50 UG/ML
INJECTION, SOLUTION INTRAMUSCULAR; INTRAVENOUS PRN
Status: DISCONTINUED | OUTPATIENT
Start: 2024-01-24 | End: 2024-01-24

## 2024-01-24 RX ORDER — ACETAMINOPHEN 325 MG/1
975 TABLET ORAL EVERY 8 HOURS PRN
Qty: 100 TABLET | Refills: 0 | Status: SHIPPED | OUTPATIENT
Start: 2024-01-24

## 2024-01-24 RX ORDER — HYDROMORPHONE HYDROCHLORIDE 1 MG/ML
0.4 INJECTION, SOLUTION INTRAMUSCULAR; INTRAVENOUS; SUBCUTANEOUS EVERY 5 MIN PRN
Status: DISCONTINUED | OUTPATIENT
Start: 2024-01-24 | End: 2024-01-24 | Stop reason: HOSPADM

## 2024-01-24 RX ORDER — ACETAMINOPHEN 325 MG/1
975 TABLET ORAL EVERY 8 HOURS
Qty: 27 TABLET | Refills: 0 | Status: DISCONTINUED | OUTPATIENT
Start: 2024-01-24 | End: 2024-01-25 | Stop reason: HOSPADM

## 2024-01-24 RX ORDER — ONDANSETRON 4 MG/1
4 TABLET, ORALLY DISINTEGRATING ORAL EVERY 6 HOURS PRN
Status: DISCONTINUED | OUTPATIENT
Start: 2024-01-24 | End: 2024-01-25 | Stop reason: HOSPADM

## 2024-01-24 RX ORDER — DEXAMETHASONE SODIUM PHOSPHATE 4 MG/ML
INJECTION, SOLUTION INTRA-ARTICULAR; INTRALESIONAL; INTRAMUSCULAR; INTRAVENOUS; SOFT TISSUE PRN
Status: DISCONTINUED | OUTPATIENT
Start: 2024-01-24 | End: 2024-01-24

## 2024-01-24 RX ORDER — AMOXICILLIN 250 MG
1-2 CAPSULE ORAL 2 TIMES DAILY
Qty: 20 TABLET | Refills: 0 | Status: SHIPPED | OUTPATIENT
Start: 2024-01-24

## 2024-01-24 RX ORDER — LIDOCAINE HYDROCHLORIDE 20 MG/ML
INJECTION, SOLUTION INFILTRATION; PERINEURAL PRN
Status: DISCONTINUED | OUTPATIENT
Start: 2024-01-24 | End: 2024-01-24

## 2024-01-24 RX ORDER — NALOXONE HYDROCHLORIDE 0.4 MG/ML
0.4 INJECTION, SOLUTION INTRAMUSCULAR; INTRAVENOUS; SUBCUTANEOUS
Status: DISCONTINUED | OUTPATIENT
Start: 2024-01-24 | End: 2024-01-25 | Stop reason: HOSPADM

## 2024-01-24 RX ORDER — HYDROMORPHONE HYDROCHLORIDE 1 MG/ML
0.2 INJECTION, SOLUTION INTRAMUSCULAR; INTRAVENOUS; SUBCUTANEOUS EVERY 5 MIN PRN
Status: DISCONTINUED | OUTPATIENT
Start: 2024-01-24 | End: 2024-01-24 | Stop reason: HOSPADM

## 2024-01-24 RX ORDER — VANCOMYCIN HYDROCHLORIDE 1 G/20ML
INJECTION, POWDER, LYOPHILIZED, FOR SOLUTION INTRAVENOUS PRN
Status: DISCONTINUED | OUTPATIENT
Start: 2024-01-24 | End: 2024-01-24 | Stop reason: HOSPADM

## 2024-01-24 RX ORDER — ACETAMINOPHEN 325 MG/1
650 TABLET ORAL EVERY 4 HOURS PRN
Status: DISCONTINUED | OUTPATIENT
Start: 2024-01-27 | End: 2024-01-25 | Stop reason: HOSPADM

## 2024-01-24 RX ORDER — BISACODYL 10 MG
10 SUPPOSITORY, RECTAL RECTAL DAILY PRN
Status: DISCONTINUED | OUTPATIENT
Start: 2024-01-24 | End: 2024-01-25 | Stop reason: HOSPADM

## 2024-01-24 RX ORDER — ASPIRIN 325 MG
325 TABLET, DELAYED RELEASE (ENTERIC COATED) ORAL 2 TIMES DAILY WITH MEALS
Status: DISCONTINUED | OUTPATIENT
Start: 2024-01-24 | End: 2024-01-25 | Stop reason: HOSPADM

## 2024-01-24 RX ORDER — HYDROMORPHONE HCL IN WATER/PF 6 MG/30 ML
0.4 PATIENT CONTROLLED ANALGESIA SYRINGE INTRAVENOUS
Status: DISCONTINUED | OUTPATIENT
Start: 2024-01-24 | End: 2024-01-25 | Stop reason: HOSPADM

## 2024-01-24 RX ORDER — SODIUM CHLORIDE, SODIUM LACTATE, POTASSIUM CHLORIDE, CALCIUM CHLORIDE 600; 310; 30; 20 MG/100ML; MG/100ML; MG/100ML; MG/100ML
INJECTION, SOLUTION INTRAVENOUS CONTINUOUS
Status: DISCONTINUED | OUTPATIENT
Start: 2024-01-24 | End: 2024-01-25 | Stop reason: HOSPADM

## 2024-01-24 RX ORDER — FENTANYL CITRATE 50 UG/ML
25 INJECTION, SOLUTION INTRAMUSCULAR; INTRAVENOUS EVERY 5 MIN PRN
Status: DISCONTINUED | OUTPATIENT
Start: 2024-01-24 | End: 2024-01-24 | Stop reason: HOSPADM

## 2024-01-24 RX ORDER — ONDANSETRON 2 MG/ML
4 INJECTION INTRAMUSCULAR; INTRAVENOUS EVERY 6 HOURS PRN
Status: DISCONTINUED | OUTPATIENT
Start: 2024-01-24 | End: 2024-01-25 | Stop reason: HOSPADM

## 2024-01-24 RX ORDER — LIDOCAINE 40 MG/G
CREAM TOPICAL
Status: DISCONTINUED | OUTPATIENT
Start: 2024-01-24 | End: 2024-01-25 | Stop reason: HOSPADM

## 2024-01-24 RX ORDER — HYDROMORPHONE HCL IN WATER/PF 6 MG/30 ML
0.2 PATIENT CONTROLLED ANALGESIA SYRINGE INTRAVENOUS
Status: DISCONTINUED | OUTPATIENT
Start: 2024-01-24 | End: 2024-01-25 | Stop reason: HOSPADM

## 2024-01-24 RX ORDER — ONDANSETRON 2 MG/ML
INJECTION INTRAMUSCULAR; INTRAVENOUS PRN
Status: DISCONTINUED | OUTPATIENT
Start: 2024-01-24 | End: 2024-01-24

## 2024-01-24 RX ORDER — OXYCODONE HYDROCHLORIDE 5 MG/1
5 TABLET ORAL EVERY 4 HOURS PRN
Status: DISCONTINUED | OUTPATIENT
Start: 2024-01-24 | End: 2024-01-25 | Stop reason: HOSPADM

## 2024-01-24 RX ORDER — FENTANYL CITRATE-0.9 % NACL/PF 10 MCG/ML
PLASTIC BAG, INJECTION (ML) INTRAVENOUS CONTINUOUS PRN
Status: DISCONTINUED | OUTPATIENT
Start: 2024-01-24 | End: 2024-01-24

## 2024-01-24 RX ORDER — CEFAZOLIN SODIUM/WATER 2 G/20 ML
2 SYRINGE (ML) INTRAVENOUS SEE ADMIN INSTRUCTIONS
Status: DISCONTINUED | OUTPATIENT
Start: 2024-01-24 | End: 2024-01-24 | Stop reason: HOSPADM

## 2024-01-24 RX ORDER — ACETAMINOPHEN 325 MG/1
975 TABLET ORAL ONCE
Status: COMPLETED | OUTPATIENT
Start: 2024-01-24 | End: 2024-01-24

## 2024-01-24 RX ORDER — HYDROXYCHLOROQUINE SULFATE 200 MG/1
200 TABLET, FILM COATED ORAL 2 TIMES DAILY
Status: DISCONTINUED | OUTPATIENT
Start: 2024-01-24 | End: 2024-01-25 | Stop reason: HOSPADM

## 2024-01-24 RX ADMIN — ASPIRIN 325 MG: 325 TABLET, COATED ORAL at 17:48

## 2024-01-24 RX ADMIN — CEFAZOLIN 1 G: 330 INJECTION, POWDER, FOR SOLUTION INTRAMUSCULAR; INTRAVENOUS at 23:53

## 2024-01-24 RX ADMIN — PROPOFOL 150 MG: 10 INJECTION, EMULSION INTRAVENOUS at 07:35

## 2024-01-24 RX ADMIN — Medication 100 MCG: at 07:54

## 2024-01-24 RX ADMIN — SODIUM CHLORIDE, POTASSIUM CHLORIDE, SODIUM LACTATE AND CALCIUM CHLORIDE 10 ML/HR: 600; 310; 30; 20 INJECTION, SOLUTION INTRAVENOUS at 07:12

## 2024-01-24 RX ADMIN — HYDROMORPHONE HYDROCHLORIDE 0.5 MG: 1 INJECTION, SOLUTION INTRAMUSCULAR; INTRAVENOUS; SUBCUTANEOUS at 08:10

## 2024-01-24 RX ADMIN — KETOROLAC TROMETHAMINE 15 MG: 15 INJECTION INTRAMUSCULAR; INTRAVENOUS at 17:48

## 2024-01-24 RX ADMIN — CEFAZOLIN 1 G: 330 INJECTION, POWDER, FOR SOLUTION INTRAMUSCULAR; INTRAVENOUS at 14:21

## 2024-01-24 RX ADMIN — FENTANYL CITRATE 50 MCG: 50 INJECTION INTRAMUSCULAR; INTRAVENOUS at 10:51

## 2024-01-24 RX ADMIN — FENTANYL CITRATE 50 MCG: 50 INJECTION, SOLUTION INTRAMUSCULAR; INTRAVENOUS at 11:19

## 2024-01-24 RX ADMIN — OXYCODONE HYDROCHLORIDE 10 MG: 10 TABLET, FILM COATED, EXTENDED RELEASE ORAL at 06:11

## 2024-01-24 RX ADMIN — SODIUM CHLORIDE, POTASSIUM CHLORIDE, SODIUM LACTATE AND CALCIUM CHLORIDE: 600; 310; 30; 20 INJECTION, SOLUTION INTRAVENOUS at 23:53

## 2024-01-24 RX ADMIN — TRANEXAMIC ACID 1950 MG: 650 TABLET ORAL at 06:11

## 2024-01-24 RX ADMIN — SENNOSIDES AND DOCUSATE SODIUM 1 TABLET: 8.6; 5 TABLET ORAL at 14:19

## 2024-01-24 RX ADMIN — CELECOXIB 400 MG: 100 CAPSULE ORAL at 06:08

## 2024-01-24 RX ADMIN — ONDANSETRON 4 MG: 2 INJECTION INTRAMUSCULAR; INTRAVENOUS at 10:30

## 2024-01-24 RX ADMIN — SENNOSIDES AND DOCUSATE SODIUM 1 TABLET: 8.6; 5 TABLET ORAL at 21:01

## 2024-01-24 RX ADMIN — ACETAMINOPHEN 975 MG: 325 TABLET, FILM COATED ORAL at 21:01

## 2024-01-24 RX ADMIN — SODIUM CHLORIDE, POTASSIUM CHLORIDE, SODIUM LACTATE AND CALCIUM CHLORIDE: 600; 310; 30; 20 INJECTION, SOLUTION INTRAVENOUS at 14:20

## 2024-01-24 RX ADMIN — ROCURONIUM BROMIDE 20 MG: 50 INJECTION, SOLUTION INTRAVENOUS at 08:15

## 2024-01-24 RX ADMIN — LIDOCAINE HYDROCHLORIDE 50 MG: 20 INJECTION, SOLUTION INFILTRATION; PERINEURAL at 07:35

## 2024-01-24 RX ADMIN — FENTANYL CITRATE 50 MCG: 50 INJECTION INTRAMUSCULAR; INTRAVENOUS at 07:26

## 2024-01-24 RX ADMIN — DEXAMETHASONE SODIUM PHOSPHATE 4 MG: 4 INJECTION, SOLUTION INTRA-ARTICULAR; INTRALESIONAL; INTRAMUSCULAR; INTRAVENOUS; SOFT TISSUE at 07:43

## 2024-01-24 RX ADMIN — HYDROXYCHLOROQUINE SULFATE 200 MG: 200 TABLET ORAL at 21:01

## 2024-01-24 RX ADMIN — ACETAMINOPHEN 975 MG: 325 TABLET, FILM COATED ORAL at 14:18

## 2024-01-24 RX ADMIN — HYDROXYCHLOROQUINE SULFATE 200 MG: 200 TABLET ORAL at 14:19

## 2024-01-24 RX ADMIN — ROCURONIUM BROMIDE 70 MG: 50 INJECTION, SOLUTION INTRAVENOUS at 07:36

## 2024-01-24 RX ADMIN — Medication 20 MCG/MIN: at 08:28

## 2024-01-24 RX ADMIN — ACETAMINOPHEN 975 MG: 325 TABLET, FILM COATED ORAL at 06:10

## 2024-01-24 RX ADMIN — OXYCODONE HYDROCHLORIDE 5 MG: 5 TABLET ORAL at 21:02

## 2024-01-24 RX ADMIN — Medication 2 G: at 07:28

## 2024-01-24 RX ADMIN — KETOROLAC TROMETHAMINE 15 MG: 15 INJECTION INTRAMUSCULAR; INTRAVENOUS at 23:53

## 2024-01-24 RX ADMIN — SODIUM CHLORIDE, POTASSIUM CHLORIDE, SODIUM LACTATE AND CALCIUM CHLORIDE: 600; 310; 30; 20 INJECTION, SOLUTION INTRAVENOUS at 10:51

## 2024-01-24 RX ADMIN — SUGAMMADEX 200 MG: 100 INJECTION, SOLUTION INTRAVENOUS at 10:51

## 2024-01-24 ASSESSMENT — ACTIVITIES OF DAILY LIVING (ADL)
ADLS_ACUITY_SCORE: 24
ADLS_ACUITY_SCORE: 25
WALKING_OR_CLIMBING_STAIRS: AMBULATION DIFFICULTY, REQUIRES EQUIPMENT
ADLS_ACUITY_SCORE: 25
ADLS_ACUITY_SCORE: 25
ADLS_ACUITY_SCORE: 24
ADLS_ACUITY_SCORE: 24

## 2024-01-24 NOTE — DISCHARGE INSTRUCTIONS
Total Hip Replacement, Direct Anterior Approach by Dr. Dick   Discharge Instructions    [phone]  B[facility] Service Line for issues or concerns    General Care:  After surgery you may feel tired/sleepy. This is normal. If you have any question along the way please contact the office. If you feel it is an issue cannot wait for normal office hours, contact the on-call physician. You should not drive or operate machines/equipment until released by your physician to do so.     Bandages:   It s normal to have some blood-tinged fluid on your bandages, this may occur for a few days after being sent home from the hospital. This is a water resistant dressing so you may shower over this. Leave the dressing placed until follow up.  If you would like, you can also take it off on the 10th day, and then do daily dressing changes with gauze and tape. You may also notice a few drops of blood from your incision as you begin to move more this is normal. Keep the area clean and dry.      Bathing:  Do not submerge your incision in water such as a bath or pool. It is ok to shower when you get home over the aquacell water resistant bandage.  You may find in comfortable to get a shower chair for the first month while you continue to heal and get stronger.     Follow up:  Your follow up appointment should already be scheduled. If it s not, please call the office to verify an appointment 10-14 days after surgery.    Diet:  You may not have a full appetite at discharge this should get better. Progress to bland foods such as crackers and bread and finally to your normal diet if you have no problems.  Avoid alcohol when taking narcotic pain medications.      Pain control:  Take your pain medications as prescribed. These medications may make you sleepy. Do not drive, operate equipment, or drink alcohol when taking these.  You may take Tylenol (Generic name is acetaminophen) as directed on the bottle for additional relief or in  place of the prescribed pain medications as your pain gets better.  If the medications cause a reaction such as nausea or skin rash, stop taking them and contact your doctor. Please plan accordingly, pain medications will not be re-filled on the weekends or at night. Call the office during the day if you need more medications.    Blood thinner:  It is very important to take the Aspirin 325 mg twice a day to help prevent blood clots. No medication is perfect, so if you notice a sudden onset of pain/swelling in your calf area call your doctor. If you notice a sudden onset of troubles breathing and/or chest pain call 911.     Icing:  It is common for some swelling, aching and stiffness to occur for awhile after a hip replacement. Apply ice for 20 minutes at a time. For the first 1-2 weeks apply ice 2-3 x a day or more after therapy/exercises.    Walker/crutches:  Use a walker/crutches when you go home. You will transition to the use of a cane and finally to no additional support.     Physical Therapy:  The success of your hip replacement is based on doing physical therapy. You will have some pain and discomfort along the way. If you feel your pain is limiting your progress make sure to take some pain medication prior to your therapy session. If your pain medications are not working talk to your surgeon.   For the first 1-2 weeks after going home you will have in-home physical therapy. The goal is to work on walking and feeling steady.  Usually after your first post-operative follow up you will move to out-patient physical therapy.   If you are going to transitional care, they will work on physical therapy there then you will have home physical therapy when discharged to home, then outpatient physical therapy after a few weeks as explained above.     Activity:  Unless otherwise instructed, you can weight bear as tolerated with a walker/cane.   For 6 weeks follow these listed precautions:   Do not cross the midline of  your body with the operated leg.    Normal findings after surgery:  Numbness and tenderness around the incisions.   You may have bruising around the incisions and down the thigh.   Low grade fevers less than 100.5 degrees Fahrenheit are normal.   You will have some increased pain after your therapy sessions.     When to call the Office:  Temperature greater than 101.5 degrees Fahreheit.  Fever, chills, and increasing pain in the hip.  Excessive drainage from the incisions that include bright red blood.  Drainage from the incisions sites that appear yellow, pus-like, or foul smelling.  Increasing pain the hip not relieved by the prescribed pain medications or ice.  Persistent nausea or vomiting not helped by the Zofran.  Increased pain or swelling in your calf area (in back above your ankle) that wasn t there when in the hospital.  Any other effects you feel are significant.  Call 911 if you experience any chest pain and/or shortness or breath.

## 2024-01-24 NOTE — PLAN OF CARE
Occupational Therapy: Orders received. Chart reviewed and discussed with care team.? Occupational Therapy not indicated due to: per PT screen, no IP OT needs identified.? Defer discharge recommendations to PT and care team.? Will complete orders.      Thank you for your referral.  Misty Oconnor OTR/CHANELLE     [facility]  O: [phone]  E: [email or web address]

## 2024-01-24 NOTE — ANESTHESIA PREPROCEDURE EVALUATION
Anesthesia Pre-Procedure Evaluation    Patient: Merlin J Weinhold   MRN: [id number] : 1945        Procedure : Procedure(s):  ARTHROPLASTY, HIP, TOTAL, DIRECT ANTERIOR APPROACH          History reviewed. No pertinent past medical history.   History reviewed. No pertinent surgical history.   Allergies   Allergen Reactions    Fluconazole Hives, Itching and Rash     Hives like blisters without fluid arms and back and legs and chest      Social History     Tobacco Use    Smoking status: Former     Types: Cigarettes     Quit date: 1974     Years since quittin.6    Smokeless tobacco: Never   Substance Use Topics    Alcohol use: Not Currently      Wt Readings from Last 1 Encounters:   24 72.6 kg (160 lb)        Anesthesia Evaluation   Pt has had prior anesthetic. Type: General and Regional (Hx of complications with spinal anesthesia).    No history of anesthetic complications       ROS/MED HX  ENT/Pulmonary:  - neg pulmonary ROS     Neurologic:  - neg neurologic ROS     Cardiovascular:  - neg cardiovascular ROS     METS/Exercise Tolerance:     Hematologic:       Musculoskeletal:   (+)  arthritis,             GI/Hepatic:  - neg GI/hepatic ROS     Renal/Genitourinary:  - neg Renal ROS     Endo:  - neg endo ROS     Psychiatric/Substance Use:  - neg psychiatric ROS     Infectious Disease:  - neg infectious disease ROS     Malignancy:  - neg malignancy ROS     Other:  - neg other ROS          Physical Exam    Airway        Mallampati: II   TM distance: > 3 FB   Neck ROM: full   Mouth opening: > 3 cm    Respiratory Devices and Support         Dental     Comment: Upper dentures         Cardiovascular   cardiovascular exam normal          Pulmonary   pulmonary exam normal                OUTSIDE LABS:  CBC:   Lab Results   Component Value Date    WBC 5.5 10/05/2023    WBC 4.9 2023    HGB 13.5 10/05/2023    HGB 12.3 (L) 2023    HCT 40.0 10/05/2023    HCT 37.7 (L) 2023     10/05/2023     " 02/01/2023     BMP:   Lab Results   Component Value Date     (L) 11/02/2023     08/11/2022    POTASSIUM 4.0 11/02/2023    POTASSIUM 3.9 08/11/2022    CHLORIDE 95 (L) 11/02/2023    CHLORIDE 101 08/11/2022    CO2 26 11/02/2023    CO2 31 08/11/2022    BUN 15.0 11/02/2023    BUN 14 08/11/2022    CR 1.05 11/02/2023    CR 1.13 10/05/2023     (H) 11/02/2023    GLC 78 08/11/2022     COAGS: No results found for: \"PTT\", \"INR\", \"FIBR\"  POC: No results found for: \"BGM\", \"HCG\", \"HCGS\"  HEPATIC:   Lab Results   Component Value Date    ALBUMIN 3.9 11/02/2023    PROTTOTAL 6.6 11/02/2023    ALT 13 11/02/2023    AST 24 11/02/2023    ALKPHOS 87 11/02/2023    BILITOTAL 0.5 11/02/2023     OTHER:   Lab Results   Component Value Date    SHIRLEY 8.9 11/02/2023    CRP <2.9 08/11/2022    SED 10 11/02/2023       Anesthesia Plan    ASA Status:  2    NPO Status:  NPO Appropriate    Anesthesia Type: General.     - Airway: ETT   Induction: Intravenous.   Maintenance: Inhalation.        Consents    Anesthesia Plan(s) and associated risks, benefits, and realistic alternatives discussed. Questions answered and patient/representative(s) expressed understanding.     - Discussed: Risks, Benefits and Alternatives for BOTH SEDATION and the PROCEDURE were discussed     - Discussed with:  Patient      - Extended Intubation/Ventilatory Support Discussed: No.      - Patient is DNR/DNI Status: No     Use of blood products discussed: Yes.     - Discussed with: Patient.     Postoperative Care    Pain management: IV analgesics.   PONV prophylaxis: Ondansetron (or other 5HT-3), Dexamethasone or Solumedrol, Background Propofol Infusion     Comments:    Other Comments: The risks and benefits of anesthesia, and the alternatives where applicable, have been discussed with the patient, and they wish to proceed.              TANA Shoemaker CRNA    I have reviewed the pertinent notes and labs in the chart from the past 30 days and " "(re)examined the patient.  Any updates or changes from those notes are reflected in this note.              # Overweight: Estimated body mass index is 25.06 kg/m  as calculated from the following:    Height as of this encounter: 1.702 m (5' 7\").    Weight as of this encounter: 72.6 kg (160 lb).      "

## 2024-01-24 NOTE — ANESTHESIA PROCEDURE NOTES
Airway       Patient location during procedure: OR       Procedure Start/Stop Times: 1/24/2024 7:38 AM  Staff -        CRNA: Charly Lynch APRN CRNA       Performed By: CRNA  Consent for Airway        Urgency: elective  Indications and Patient Condition       Indications for airway management: tyron-procedural       Induction type:intravenous       Mask difficulty assessment: 1 - vent by mask    Final Airway Details       Final airway type: endotracheal airway       Successful airway: ETT - single  Endotracheal Airway Details        ETT size (mm): 7.5       Cuffed: yes       Successful intubation technique: direct laryngoscopy       DL Blade Type: MAC 3       Grade View of Cords: 1       Adjucts: stylet       Position: Right       Measured from: gums/teeth       Secured at (cm): 23    Post intubation assessment        Placement verified by: capnometry, equal breath sounds and chest rise        Number of attempts at approach: 1       Number of other approaches attempted: 0       Secured with: plastic tape       Ease of procedure: easy       Dentition: Intact and Unchanged    Medication(s) Administered   Medication Administration Time: 1/24/2024 7:38 AM

## 2024-01-24 NOTE — OR NURSING
Report given to ERNA Rausch/S RN to assume care in PHII, Rm 250.  Transferred via glyder mat.  , IV fluid 1700, BP highly sensitive to opioids.

## 2024-01-24 NOTE — PROGRESS NOTES
S-(situation): Patient registered to Observation. Patient arrived to room 250 via cart from PACU.    B-(background): Total left hip arthoplasty.    A-(assessment): Pt. A&Ox4, drowsy. VSS on RA, BP on soft side. Dressing to left hip is CDI, no swelling noted, ice applied. Skin WNL. CMS intact, sensation to LLE nearly back to baseline per patient.     R-(recommendations): Orders and observation goals reviewed with patient and spouse.    Nursing Observation criteria listed below was met:    Skin issues/needs documented:NA  Isolation needs addressed and Signage up: NA  Fall Prevention: Education given and documented: Yes  Education Assessment documented:Yes  Admission Education Documented: Yes  New medication patient education completed and documented (Possible Side Effects of Common Medications handout): Yes  OBS video/handout Reviewed & Documented: Yes  Allergies Reviewed: Yes  Medication Reconciliation Complete: Yes  Home medications if not able to send immediately home with family stored here: NA  Reminder note placed in discharge instructions of home meds: NA  Patient has discharge needs (If yes, please explain): Yes  Patient discharge preferences addressed and charted on white board:  Yes  Provider notified that patient has arrived to the unit: Yes

## 2024-01-24 NOTE — OP NOTE
DATE OF SERVICE: 1/24/2024     SURGEON: DUONG OCHOA MD     ASSISTANT:  Dex Yi PA-C     Assistance of Dex Yi PA-C was medically necessary given the technical complexity of the case; with assistance with patient positioning, retraction and hip joint exposure, limb manipulation, assistance with implant placement, trial and final arthroplasty hip implant reduction, and wound closure.     PREOPERATIVE DIAGNOSIS:  left hip primary osteoarthritis.     POSTOPERATIVE DIAGNOSIS:  left hip primary osteoarthritis.     PROCEDURE:  left total hip arthroplasty. Direct anterior approach     COMPONENTS IMPLANTED:         Biomet G7 cup size 60     Boimet Taperloc stem size 14 high offset.     Biomet 36 mms metal head with -3 mms neck    ANESTHESIA:General ; in the supine position on the [facility] surgical table.    ESTIMATED BLOOD LOSS:  500 cc    ANTIBIOTICS:  cefazolin prior to incision.    Tranexamic Acid: 1950 mg oral 90 minutes preop    DRAINS:  None.    COMPLICATIONS:  None.     SPECIMENS: none      FINDINGS: advanced hip osteoarthritis, eburnated femoral head with marginal osteophytes. Acetabulum with impinging marginal osteophytes.    INDICATIONS FOR PROCEDURE: Merlin J Weinhold is a pleasant 78 year old male who has had ongoing significant pain in the left hip. Xrays with advanced hip degenerative arthrosis with loss of joint space and marginal osteophytes. The risk/benefit analysis of the above left total hip arthroplasty was explained. Alternate bearing technology and the additional risks of a press fit hip were explained, including fracture. Risks discussed to include, but not be limited to: wear, loosening, infection, bleeding, pain, scar, thromboembolic disease, neurovascular damage with temporary or permanent nerve damage, limb length inequality, implant failure, implant dislocation, tyron-prosthetic fracture, need for further surgery, risks of anesthesia and death.  Despite these risks, the patient  elected to proceed and, with the patient's permission, was taken to the operating room.       DESCRIPTION OF PROCEDURE:  The patient was identified in the preoperative holding area.  After confirming with the patient the correct procedure and procedure site, the left hip was marked with an indelible marker by myself, the attending surgeon.  After again reviewing the risks and perceived benefits of surgery, questions were addressed, he elected to proceed and written informed consent was obtained and reviewed.     The patient was then taken to the operating room and placed supine on the operating surgical [place] table.  After adequate anesthesia, a Presley catheter was placed.  The lower extremities were placed in the boots and suspended.  The arms were well positioned and padded to be comfortable.  The left lower extremity was then prepped and draped in the usual sterile fashion.     A timeout was then performed confirming the correct patient, procedure, procedure site, availability of instruments and implants, the administration of prophylactic antibiotics as well as a review of the patient's allergies by all surgical staff.    An anterior based incision was made approximately two fingerbreadths lateral (3cm) and two fingerbreadths distal to the ASIS in an oblique fashion extending posterior-distally towards the anterior aspect of the femur approximately 10-12 cm in length, sharply through skin.  We then used electrocautery through the subcutaneous tissues, performing hemostasis.  Perforating vessels were cauterized. The fascia over the tensor was then incised longitudinally.  I then proceeded to dissect the tensor muscle off the  undersurface of the fascia anteriorly and medially, exposing the underlying tissues and capsule as well as the rectus femoris medially.  A retractor was placed laterally over the neck as well as inferiorly over the femoral neck and anteriorly over the acetabulum rim deep to the rectus.  We  "then proceeded to use a Maggie retractor laterally and a vascular bundle of the circumflex vessels was identified.  Using the Aquamantys, these vessels were then cauterized.  Good exposure of the anterior aspect  of the femoral neck capsule was obtained.  I then proceeded to make a capsulotomy an inverted \"T\" fashion, starting at the superolateral aspect of the acetabulum and femoral head, along the superior aspect of the femoral neck down to the intertrochanteric ridge and then distally in an oblique fashion along the intertrochanteric ridge towards the lesser trochanter.  This anterior capsule was then tagged. I also tagged the superolateral capsule at the saddle of the trochanter and gently released this as well.  This exposed the underlying femoral head and neck. There were prominent marginal osteophytes. The retractors were then placed intracapsular.  Further  capsular releases were performed inferiorly along the medial calcar to get to the base of the lesser trochanter as well as laterally into the trochanteric saddle.     I then used a C-arm to get positioning with making the femoral neck cut.  After determining adequate cut to the templated length, a first cut was made at the intended final cut.  Care was taken to avoid the posterior hanging greater trochanter.   Using a corkscrew into the femoral head, using a twisting motion,  the femoral head was then removed in line with the fibers of the tensor muscle with care taken to protect the tensor.  The wound was then irrigated.  I then proceeded to place a retractor anteriorly over the anterior/inferior acetabulum, inferiorly over the transverse acetabular ligament as well as posteriorly within the capsule, giving good exposure to the acetabulum.  I then proceeded using either a long-handled knife or electrocautery to remove labral tissue as well as the  pulvinar at the acetabular floor. Overhanging osteophytes were removed.  There was significant medial " acetabular osteophyte.  Once the soft tissue was cleared out, I then proceeded to ream.  We were using the [employer] system.  Using a 54 mm reamer and using a C-arm, the acetabulum was medialized to the teardrop, removing the medial osteophyte.  I then sequentially increased reamer sizes by 2 mm, up to a 59, maintaining correct abduction and version, which was confirmed with the C-arm.  We trialed a size 59 cup.  This  was a very good and tight fit.  I then proceeded to place the 60 mms G7 acetabular shell using the C-arm to guide the abduction angle as well as anteversion, which was approximately 20-25 degrees.  2 screws were added.  Once this was confirmed, the final neutral acetabular polyethylene liner was placed.  The wound was irrigated.     I then proceeded to the femur.  A hook retractor was placed along the lateral aspect of the trochanter just distal to the vastus tubercle. Using my hand, I pulled the femur laterally taking care that it was not caught under the acetabulum. We then proceeded to lower the limb down to the floor and slightly adduct, externally rotating the foot to 120 degrees (femur about 90 degrees), giving good femoral exposure.  A retractor was placed medially along the calcar.  I then proceeded to release the lateral capsule along the inner aspect of the greater trochanter, taking care of  the underlying rotators; these tendons were exposed and kept intact.  This gave better exposure of the femur. A shepards hook was placed over the tip of the greater trochanter.  I then proceeded to connect the femoral hook to the lift and using my hand to lift the femur, proceeded to elevate the lift until there was firm pressure against my hand.  This gave us adequate exposure of the femur for broaching.  A cookie cutter was placed laterally to remove some of the remnant of the lateral neck as well as a  rongeur.  A canal finding awl was placed down the femur.  I then proceeded to broach for the Taperloc,  first starting with the chili pepper broach followed by sequential broaching up to the 13 size.  This provided a good fit.  This was calcar planed.  The wound was irrigated.  I then proceeded to do a trial reduction,  Starting off with a 0 femoral head, releasing the femoral hook and removing the retractors and bringing the leg back up to a neutral position,  the hip was easily reduced.  Using C-arm, we assess length using the overlay technique with printed images. I felt we could advance to a 14 broach.  I placed this fully. Once the final length was determined trialing various head sizes using the overlay technique, we proceed to dislocate the trial and proceed with final femoral stem placement. The 14 high offset Taperloc femoral stem was then placed.  This provided good fixation on the femoral component.  I then proceeded to trial once again the previous trialed femoral head and this was reduced, with final images showing good length with the -3 neck. It appeared to be a good fit and stable.  This trial was then dislocated and the  final 36 mms metal femoral head with -3 neck was placed.  The hip was reduced.  Joint block was injected.  The wound was then copiously irrigated starting with a liter of dilute Betadine followed by three liters of antibiotic normal saline.  The  capsule was repaired side-to-side with #2 Ethibond and #2 vicryl.  One gram of vancomycin powder was placed deep in the wound.  The fascia over the tensor was closed side-to-side with #0 Vicryl.  The subcutaneous and dermal tissues were then approximated with 2-0 Vicryl and 3-0 Monocryl.  3-0 Monocryl subcuticular suture was used to maintain good skin eversion and apposition with Dermabond to seal the skin edges.  A sterile waterproof  dressing was applied over the incision.  The patient was then awakened and extubated and taken to the recovery room without difficulty in stable condition.  There were no apparent complications.       A  postoperative pelvis x-ray will be obtained in recovery.      The postoperative plan will be to weight bear as tolerated.  No hip precautions.  Twenty-three hours of IV antibiotics.  Anticoagulation will proceed for 6 weeks postoperative and will include twice daily 325 mg aspirin.     There were no apparent complications.    Dr. Nba Dick

## 2024-01-24 NOTE — ANESTHESIA CARE TRANSFER NOTE
Patient: Merlin J Weinhold    Procedure: Procedure(s):  ARTHROPLASTY, HIP, TOTAL, DIRECT ANTERIOR APPROACH LEFT       Diagnosis: Primary osteoarthritis of left hip [M16.12]  Diagnosis Additional Information: No value filed.    Anesthesia Type:   General     Note:    Oropharynx: oropharynx clear of all foreign objects and spontaneously breathing  Level of Consciousness: drowsy  Oxygen Supplementation: face mask  Level of Supplemental Oxygen (L/min / FiO2): 6  Independent Airway: airway patency satisfactory and stable  Dentition: dentition unchanged  Vital Signs Stable: post-procedure vital signs reviewed and stable  Report to RN Given: handoff report given  Patient transferred to: PACU    Handoff Report: Identifed the Patient, Identified the Reponsible Provider, Reviewed the pertinent medical history, Discussed the surgical course, Reviewed Intra-OP anesthesia mangement and issues during anesthesia, Set expectations for post-procedure period and Allowed opportunity for questions and acknowledgement of understanding    Vitals:  Vitals Value Taken Time   /61 01/24/24 1214   Temp 98  F (36.7  C) 01/24/24 1155   Pulse 89 01/24/24 1214   Resp 10 01/24/24 1214   SpO2 100 % 01/24/24 1214       Electronically Signed By: TANA Shoemaker CRNA  January 24, 2024  12:32 PM

## 2024-01-24 NOTE — PROGRESS NOTES
Merlin J Weinhold is a 78 year old male with severe left hip osteoarthritis.  Xray shows severe wear.  Has failed conservative treatment.  Range of motion is 60 degrees external rotation, 30 degrees internal rotation.  Left leg is 3 mms short..  Presents for left total hip arthroplasty.  This H&P has been reviewed and there are no clinically significant changes in the patient s condition.  The patient was evaluated by myself as well as (H&P provider) prior to surgery. The patient is approved for the surgery and the stated surgical procedure is still clinically indicated.  Risks, benefits, potential complications and alternatives were discussed.    Nba Dick M.D.  Department of Orthopaedic Surgery  [facility]  Pager: [phone]

## 2024-01-24 NOTE — PROGRESS NOTES
"   01/24/24 1500   Appointment Info   Signing Clinician's Name / Credentials (PT) Emmy Vela, PT, DPT   Quick Adds   Quick Adds Certification       Present no   Living Environment   People in Home spouse   Current Living Arrangements house   Home Accessibility stairs to enter home;stairs within home   Number of Stairs, Main Entrance 3   Stair Railings, Main Entrance railing on right side (ascending)   Transportation Anticipated family or friend will provide   Self-Care   Equipment Currently Used at Home cane, straight;walker, rolling   Fall history within last six months no   General Information   Onset of Illness/Injury or Date of Surgery 01/24/24   Referring Physician Dr. Dick   Patient/Family Therapy Goals Statement (PT) Return home with wife.   Pertinent History of Current Problem (include personal factors and/or comorbidities that impact the POC) Per chart \"Merlin J Weinhold is a 78 year old male who is seen in consultation at the request of Dr. Sayra Nance for left hip pain.  He has had progressive problems with the left hip for many years.  He has tried injections at least 3 times in the past.  The first 1 worked for many months, but then less duration after that.  He has also had trochanteric bursitis and received an injection of the back.  This helped his lateral pain, but not his groin pain.  He has also had physical therapy going over strengthening and motion exercises.\"  Pt lives at home with his wife and has the equipment needed for mobility and ADLs.   Existing Precautions/Restrictions no known precautions/restrictions   Weight-Bearing Status - LLE weight-bearing as tolerated   Weight-Bearing Status - RLE full weight-bearing   General Observations Resting EOB ready to get up with RN staff to go to the restroom   Cognition   Affect/Mental Status (Cognition) WFL   Orientation Status (Cognition) oriented x 4   Follows Commands (Cognition) WNL   Pain Assessment   Patient " Currently in Pain   (4-5/10)   Integumentary/Edema   Integumentary/Edema Comments Around incision   Posture    Posture Forward head position;Protracted shoulders   Range of Motion (ROM)   Range of Motion ROM deficits secondary to surgical procedure   ROM Comment Sore more in the L hip.  R LE is WNL.   Strength (Manual Muscle Testing)   Strength Comments Able to perform heel slide and leg raise on the L LE.  R LE is WNL.   Bed Mobility   Bed Mobility rolling left;rolling right;scooting/bridging;supine-sit;sit-supine   Rolling Left [place] (Bed Mobility) independent   Rolling Right [place] (Bed Mobility) independent   Scooting/Bridging [place] (Bed Mobility) independent   Supine-Sit [place] (Bed Mobility) contact guard   Sit-Supine [place] (Bed Mobility) dependent (less than 25% patient effort);1 person assist   Transfers   Transfers sit-stand transfer   Sit-Stand Transfer   Sit-Stand [place] (Transfers) contact guard   Assistive Device (Sit-Stand Transfers) walker, front-wheeled   Comment, (Sit-Stand Transfer) Pt became very dizzy and started voiding on the floor.   Gait/Stairs (Locomotion)   Comment, (Gait/Stairs) Did not attempt due to dizziness with standing.  RN staff with attempt walking tonight.   Balance   Balance Comments Sitting balance WNL.  Standing requiring CGA and FWW due to dizziness.   Sensory Examination   Sensory Perception patient reports no sensory changes   Coordination   Coordination no deficits were identified   Muscle Tone   Muscle Tone no deficits were identified   Clinical Impression   Criteria for Skilled Therapeutic Intervention Yes, treatment indicated   PT Diagnosis (PT) Post op day 0 L DONATO   Influenced by the following impairments Pain   Functional limitations due to impairments Bed mobility, transfers, ambulation   Clinical Presentation (PT Evaluation Complexity) stable   Clinical Presentation Rationale   (Clinical assessment and judgement)   Clinical  Decision Making (Complexity) low complexity   Planned Therapy Interventions (PT) balance training;gait training;home exercise program;neuromuscular re-education;patient/family education;ROM (range of motion);stair training;strengthening;transfer training;home program guidelines;risk factor education   Risk & Benefits of therapy have been explained evaluation/treatment results reviewed;care plan/treatment goals reviewed;risks/benefits reviewed;participants voiced agreement with care plan;participants included;patient;spouse/significant other   Clinical Impression Comments Pt is a 78 year old post op day 0 of L DONATO.  Pt lives at home with his wife with 3 steps to enter the home.  Has walker, just ordered a shower bench, and reacher/grabber for home.  Pt's wife has the spare bedroom with a higher bed ready for him and will be able to stay and support him throughout the day.  She is willing to transport him home as well.  Pt will benefit from skilled PT services to address ambulation and stairs prior to discharge.   PT Total Evaluation Time   PT Eval, Low Complexity Minutes (75478) 20   Therapy Certification   Start of care date 01/24/24   Certification date from 01/24/24   Certification date to 01/25/24   Medical Diagnosis S/P total hip arthroplasty   Physical Therapy Goals   PT Frequency   (2 visits)   PT Predicted Duration/Target Date for Goal Attainment 01/25/24   PT Goals Bed Mobility;Transfers;Gait;Stairs   PT: Bed Mobility Independent;Supine to/from sit   PT: Transfers Supervision/stand-by assist;Sit to/from stand;Bed to/from chair;Assistive device   PT: Gait Supervision/stand-by assist;Rolling walker;100 feet   PT: Stairs Supervision/stand-by assist;3 stairs;Rail on right   Interventions   Interventions Quick Adds Therapeutic Procedure   Therapeutic Procedure/Exercise   Ther. Procedure: strength, endurance, ROM, flexibillity Minutes (44877) 15   Symptoms Noted During/After Treatment increased pain;fatigue    Treatment Detail/Skilled Intervention Pt was brought back to bed with help of RN after attempting to stand to get him to the rest room.  Pt was able to scoot himself up in bed with use of legs and arms.  Completed DONATO HEP: ankle pumps, quad sets, hamstring sets, gluteal sets, hip abd, SAQ, and heel slides x 10 each, SLR with AAROM x 5, more sore.   PT Discharge Planning   PT Plan 1/2 WED, ambulation, stairs   PT Discharge Recommendation (DC Rec) home with assist   PT Rationale for DC Rec Pt lives at home with wife with 3 small steps to enter the home. Has walker, ordered shower bench and waiting for it to be delivered, and wife will be home with him for the next week.  Recommend pt return home with assistance.   PT Brief overview of current status Did not assess mobility except IND with scooting self in bed.  Pt performed exercises well.  Expectation to get up and walk with RN staff tonight.   Total Session Time   Timed Code Treatment Minutes 15   Total Session Time (sum of timed and untimed services) 35   [facility]  OUTPATIENT PHYSICAL THERAPY EVALUATION  PLAN OF TREATMENT FOR OUTPATIENT REHABILITATION  (COMPLETE FOR INITIAL CLAIMS ONLY)  Patient's Last Name, First Name, M.I.  YOB: 1945  Weinhold,Merlin J                        Provider's Name  [facility] Medical Record No.  [id number]                             Onset Date:  01/24/24   Start of Care Date:  01/24/24  Through: 1/25/2024   Type:     _X_PT   ___OT   ___SLP Medical Diagnosis:  S/P total hip arthroplasty              PT Diagnosis:  Post op day 0 L DONATO Visits from SOC:  1     See note for plan of treatment, functional goals and certification details    I CERTIFY THE NEED FOR THESE SERVICES FURNISHED UNDER        THIS PLAN OF TREATMENT AND WHILE UNDER MY CARE     (Physician co-signature of this document indicates review and certification of the therapy plan).

## 2024-01-24 NOTE — BRIEF OP NOTE
[facility]    Brief Operative Note    Pre-operative diagnosis: Primary osteoarthritis of left hip [M16.12]  Post-operative diagnosis Same as pre-operative diagnosis    Procedure: ARTHROPLASTY, HIP, TOTAL, DIRECT ANTERIOR APPROACH LEFT, Left - Hip    Surgeon: Surgeon(s) and Role:     * Nba Dick MD - Primary     * Dex Yi PA-C - Assisting  Anesthesia: General   Estimated Blood Loss: 500 mL from 1/24/2024  7:30 AM to 1/24/2024 10:59 AM      Drains: None  Specimens:   ID Type Source Tests Collected by Time Destination   A : left femoral head Bone Resection Femoral Head, Left OR DOCUMENTATION ONLY Nba Dick MD 1/24/2024  8:41 AM      Findings:   Left hip osteoarthritis .  Complications: None.  Implants:   Implant Name Type Inv. Item Serial No.  Lot No. LRB No. Used Action   IMP SHELL BIOM G7 ACETAB PPS MILLER HOLE 60MM SZ G [id number] - [id number] Total Joint Component/Insert IMP SHELL BIOM G7 ACETAB PPS MILLER HOLE 60MM  G [id number]  [employer] [id number] Left 1 Implanted   IMP SCR ZIM 6.5X30MM ACET CUP SELF TAP -329-30 - [id number] Metallic Hardware/[place] IMP SCR ZIM 6.5X30MM ACET CUP SELF TAP -782-30  [employer] [id number] Left 1 Implanted   IMP SCR ZIM 6.5X40MM ACET CUP SELF TAP -779-40 - [id number] Metallic Hardware/[place] IMP SCR ZIM 6.5X40MM ACET CUP SELF TAP -785-40  [employer] [id number] Left 1 Implanted   LINER ACTB G7 G 36MM LNGVT HIP STRL LUM LF - [id number] Metallic Hardware/[place] LINER ACTB G7 G 36MM LNGVT HIP STRL LUM LF  [employer] [id number] Left 1 Implanted   IMP STEM FEM BIOM TAPERLOC HI OFFSET TYPE 1 SZ14 [id number] - [id number] Total Joint Component/Insert IMP STEM FEM BIOM TAPERLOC HI OFFSET TYPE 1 SZ14 [id number]  [employer] [id number] Left 1 Implanted   IMP HEAD FEMORAL BIOM MOD 36MM -3  [id number] - [id number] Total Joint Component/Insert IMP HEAD FEMORAL BIOM MOD 36 -3  [id number]   [employer] [id number] Left 1 Implanted

## 2024-01-24 NOTE — PHARMACY-RX INSURANCE COVERAGE
Medication Scribe Admission Medication History    Admission medication history is complete. The information provided in this note is only as accurate as the sources available at the time of the update.    Information Source(s): Patient via phone    Pertinent Information: Med scribe review completed post RN review. Reconcile list cleared out according to patient's report.     Changes made to PTA medication list:  Added: None  Deleted: None  Changed: Ibuprofen 400 mg tablet changed to 200 mg as per patient's report; was taking OTC which is only available in 200 mg only.     Medication Affordability:       Allergies reviewed with patient and updates made in EHR: yes    Medication History Completed By: GENA RODRIGUEZ 1/24/2024 12:43 PM    Current Facility-Administered Medications for the 1/24/24 encounter (Hospital Encounter)   Medication    lidocaine 1 % injection 2 mL    triamcinolone (KENALOG-40) injection 40 mg     PTA Med List   Medication Sig Last Dose    acetaminophen (TYLENOL) 325 MG tablet Take 3 tablets (975 mg) by mouth every 8 hours as needed for other (mild pain)     acetaminophen (TYLENOL) 500 MG tablet Take 500-1,000 mg by mouth every 6 hours as needed for mild pain 1/23/2024    aspirin (ASA) 325 MG EC tablet Take 1 tablet (325 mg) by mouth 2 times daily for 40 days     Cholecalciferol (VITAMIN D-3) 125 MCG (5000 UT) TABS Take 1 tablet by mouth daily Past Week    FIBER PO Melaleuca Fiberwise Take daily as directed Past Week    HYDROcodone-acetaminophen (NORCO) 5-325 MG tablet Take 1 tablet by mouth in the morning and 1 tablet in the evening. Past Week    hydroxychloroquine (PLAQUENIL) 200 MG tablet Take 1 tablet (200 mg) by mouth 2 times daily Past Week    ibuprofen (ADVIL/MOTRIN) 200 MG capsule Take 400 mg by mouth every 6 hours as needed for moderate pain 1/23/2024    MAGNESIUM PO Take 1 tablet by mouth daily Melaleuca Past Week    Multiple Vitamin (MULTIVITAMIN PO) Take 1 tablet by mouth 2 times daily  NutriDyn Essential Minerals Past Week    Multiple Vitamin (STRESS FORMULA PO) NutriDyn Stress Essentials Adrenal B1B6 Take 1 tab with each meal Past Week    Multiple Vitamins-Minerals (IMMUNE SUPPORT PO) Take 1 tablet by mouth 2 times daily NutriDyn Immune Resilience Past Week    naloxone (NARCAN) 4 MG/0.1ML nasal spray Spray 4 mg in nostril once as needed for opioid reversal     Nutritional Supplements (DHEA PO) Bio-Srinivas [place]. 1 spray daily Past Week    Omega-3 Fatty Acids (OMEGA 3 PO) Melaleuca Omega 3  Take one cap twice daily Past Week    oxyCODONE (ROXICODONE) 5 MG tablet Take 1-2 tablets (5-10 mg) by mouth every 4 hours as needed for moderate to severe pain     PROTEIN PO Melaleuca Protein Drink, one daily Past Week    senna-docusate (SENOKOT-S/PERICOLACE) 8.6-50 MG tablet Take 1-2 tablets by mouth 2 times daily Take while on oral narcotics to prevent or treat constipation.     UNABLE TO FIND MEDICATION NAME: NutriDyn Testro Balance Take 1 tab daily Past Week    UNABLE TO FIND MEDICATION NAME: Malaleuca Good Zymes Take 1 with each meal Past Week    UNABLE TO FIND MEDICATION NAME: Melaleuca Prostavan 1 tab daily Past Week    UNABLE TO FIND MEDICATION NAME: Melaleuca Nutraview Take one tab daily Past Week    UNABLE TO FIND MEDICATION NAME: Melaleuca Replenex Take 1 tab twice daily Past Week    UNABLE TO FIND MEDICATION NAME: Melaleuca Multivitamin Take 1 tab twice daily Past Week    vitamin C (ASCORBIC ACID) 1000 MG TABS Take 1,000-2,000 mg by mouth in the morning. Past Week

## 2024-01-25 ENCOUNTER — APPOINTMENT (OUTPATIENT)
Dept: PHYSICAL THERAPY | Facility: CLINIC | Age: 79
End: 2024-01-25
Attending: ORTHOPAEDIC SURGERY
Payer: COMMERCIAL

## 2024-01-25 VITALS
DIASTOLIC BLOOD PRESSURE: 54 MMHG | OXYGEN SATURATION: 96 % | TEMPERATURE: 97.9 F | RESPIRATION RATE: 18 BRPM | HEART RATE: 84 BPM | HEIGHT: 67 IN | BODY MASS INDEX: 25.47 KG/M2 | SYSTOLIC BLOOD PRESSURE: 109 MMHG | WEIGHT: 162.26 LBS

## 2024-01-25 LAB
ANION GAP SERPL CALCULATED.3IONS-SCNC: 11 MMOL/L (ref 7–15)
BUN SERPL-MCNC: 22.6 MG/DL (ref 8–23)
CALCIUM SERPL-MCNC: 8.1 MG/DL (ref 8.8–10.2)
CHLORIDE SERPL-SCNC: 99 MMOL/L (ref 98–107)
CREAT SERPL-MCNC: 1.21 MG/DL (ref 0.67–1.17)
DEPRECATED HCO3 PLAS-SCNC: 21 MMOL/L (ref 22–29)
EGFRCR SERPLBLD CKD-EPI 2021: 62 ML/MIN/1.73M2
GLUCOSE SERPL-MCNC: 111 MG/DL (ref 70–99)
HGB BLD-MCNC: 8.2 G/DL (ref 13.3–17.7)
POTASSIUM SERPL-SCNC: 4.6 MMOL/L (ref 3.4–5.3)
SODIUM SERPL-SCNC: 131 MMOL/L (ref 135–145)

## 2024-01-25 PROCEDURE — 97116 GAIT TRAINING THERAPY: CPT | Mod: GP | Performed by: PHYSICAL THERAPIST

## 2024-01-25 PROCEDURE — 85018 HEMOGLOBIN: CPT | Performed by: ORTHOPAEDIC SURGERY

## 2024-01-25 PROCEDURE — 80048 BASIC METABOLIC PNL TOTAL CA: CPT | Performed by: ORTHOPAEDIC SURGERY

## 2024-01-25 PROCEDURE — 250N000011 HC RX IP 250 OP 636: Performed by: ORTHOPAEDIC SURGERY

## 2024-01-25 PROCEDURE — 36415 COLL VENOUS BLD VENIPUNCTURE: CPT | Performed by: ORTHOPAEDIC SURGERY

## 2024-01-25 PROCEDURE — 250N000013 HC RX MED GY IP 250 OP 250 PS 637: Performed by: ORTHOPAEDIC SURGERY

## 2024-01-25 RX ADMIN — HYDROXYCHLOROQUINE SULFATE 200 MG: 200 TABLET ORAL at 08:06

## 2024-01-25 RX ADMIN — KETOROLAC TROMETHAMINE 15 MG: 15 INJECTION INTRAMUSCULAR; INTRAVENOUS at 06:21

## 2024-01-25 RX ADMIN — OXYCODONE HYDROCHLORIDE 5 MG: 5 TABLET ORAL at 08:06

## 2024-01-25 RX ADMIN — ACETAMINOPHEN 975 MG: 325 TABLET, FILM COATED ORAL at 06:21

## 2024-01-25 RX ADMIN — POLYETHYLENE GLYCOL 3350 17 G: 17 POWDER, FOR SOLUTION ORAL at 08:05

## 2024-01-25 RX ADMIN — OXYCODONE HYDROCHLORIDE 5 MG: 5 TABLET ORAL at 03:33

## 2024-01-25 RX ADMIN — ACETAMINOPHEN 975 MG: 325 TABLET, FILM COATED ORAL at 12:40

## 2024-01-25 RX ADMIN — ASPIRIN 325 MG: 325 TABLET, COATED ORAL at 08:06

## 2024-01-25 RX ADMIN — KETOROLAC TROMETHAMINE 15 MG: 15 INJECTION INTRAMUSCULAR; INTRAVENOUS at 12:40

## 2024-01-25 RX ADMIN — SENNOSIDES AND DOCUSATE SODIUM 1 TABLET: 8.6; 5 TABLET ORAL at 08:06

## 2024-01-25 ASSESSMENT — ACTIVITIES OF DAILY LIVING (ADL)
ADLS_ACUITY_SCORE: 25

## 2024-01-25 NOTE — PROGRESS NOTES
S-(situation): Patient discharged to home via wheelchair with spouse.    B-(background): Total left hip arthroplasty.    A-(assessment): Pt. VSS, pain well controlled with oral medications. Voiding well, tolerating regular diet. Ambulating in halls SBA w/ walker. Dressing CDI, no swelling noted.     R-(recommendations): Discharge instructions reviewed with patient and spouse. Listed belongings gathered and returned to patient.      Discharge Nursing Criteria:     Care Plan and Patient education resolved: Yes    New Medications- pt has been educated about purpose and side effects: Yes    Vaccines  Influenza status verified at discharge:  Yes    MISC  Prescriptions if needed, hard copies sent with patient  Yes  Home medications returned to patient: NA  Medication Bin checked and emptied on discharge Yes  Patient reports post-discharge pain management plan is effective: Yes    TKA/DONATO ONLY:   Discharged with VANCE Stockings Yes  VANCE stocking Education Completed Yes

## 2024-01-25 NOTE — PROGRESS NOTES
Patient vital signs are at baseline: Yes  Patient able to ambulate as they were prior to admission or with assist devices provided by therapies during their stay:  No,  Reason:  Pt. Attempt to ambulate to restroom, became dizzy, diaphoretic. Assisted back to bed. No change in BP or HR noted.   Patient MUST void prior to discharge:  Yes  Patient able to tolerate oral intake:  Yes  Pain has adequate pain control using Oral analgesics:  Yes  Does patient have an identified :  Yes  Has goal D/C date and time been discussed with patient:  Yes

## 2024-01-25 NOTE — ANESTHESIA POSTPROCEDURE EVALUATION
Patient: Merlin J Weinhold    Procedure: Procedure(s):  ARTHROPLASTY, HIP, TOTAL, DIRECT ANTERIOR APPROACH LEFT       Anesthesia Type:  General    Note:  Disposition: Outpatient   Postop Pain Control: Uneventful            Sign Out: Well controlled pain   PONV: No   Neuro/Psych: Uneventful            Sign Out: Acceptable/Baseline neuro status   Airway/Respiratory: Uneventful            Sign Out: Acceptable/Baseline resp. status   CV/Hemodynamics: Uneventful            Sign Out: Acceptable CV status   Other NRE: NONE   DID A NON-ROUTINE EVENT OCCUR? No    Event details/Postop Comments:  Pt was happy with anesthesia care.  No complications.  I will follow up with the pt if needed.           Last vitals:  Vitals Value Taken Time   /61 01/24/24 1214   Temp 98  F (36.7  C) 01/24/24 1155   Pulse 89 01/24/24 1214   Resp 10 01/24/24 1214   SpO2 100 % 01/24/24 1214       Electronically Signed By: TANA Shoemaker CRNA  January 25, 2024  1:33 PM

## 2024-01-25 NOTE — PROGRESS NOTES
"ORTHO PROGRESS NOTE    POD # 1  Procedure(s):  ARTHROPLASTY, HIP, TOTAL, DIRECT ANTERIOR APPROACH LEFT - on 2024    Pain:  mild    /54 (BP Location: Right arm, Patient Position: Semi-Bruce's, Cuff Size: Adult Regular)   Pulse 84   Temp 97.9  F (36.6  C) (Oral)   Resp 18   Ht 1.702 m (5' 7\")   Wt 73.6 kg (162 lb 4.1 oz)   SpO2 96%   BMI 25.41 kg/m      Temp (24hrs), Av.3  F (36.8  C), Min:97.6  F (36.4  C), Max:99  F (37.2  C)      Recent Labs   Lab Test 24  0537 10/05/23  0940 23  0944   HGB 8.2* 13.5 12.3*               Circulation intact.   Sensation intact.   Calves soft and nontender.   Incision is covered      PT  walked in halls.         ASSESSMENT:  left total hip arthroplasty doing well.    PLAN:  Discharge today.  Aspirin: Take 1 tablet (325 mg) by mouth twice daily for blood thinning for 6 weeks.   Tylenol 1000 mg three times daily for pain.  May stop when you don't have pain.  Oxycodone as needed for severe pain.  No flexion limit.  Do not cross your knees.  Use pillow between knees if sleeping on your side.  Weight bearing as tolerated.  Use walker as needed.  Return to clinic 10-14 days.     Nba Dick M.D.  Department of Orthopaedic Surgery  [facility]  Pager: [phone]      "

## 2024-01-25 NOTE — DISCHARGE SUMMARY
"DISCHARGE SUMMARY    Primary MD: Cristy Connolly    ADMIT DATE: 1/24/2024  DISCHARGE DATE: 1/25/2024    DISCHARGE DIAGNOSIS:  left hip osteoarthritis.    PROCEDURE:  left total hip arthroplasty.    HISTORY:  Merlin J Weinhold is a 78 year old male with severe osteoarthritis of the left hip.  He has failed conservative treatment and presents for total hip arthroplasty.    HOSPITAL COURSE:  On 1/24/2024 Mr. Arreaga underwent total hip arthroplasty with direct anterior approach without complications.  His hemoglobin dropped to a low of 8.2.   His progress with Physical Therapy was very good.  He is discharged on POD # 1 with wound clean without drainage.  He will start Aspirin for blood thinning.  For pain control, a prescription for oxycodone  was written.  Return to clinic 1 1/2 weeks for wound check.     Weight bearing as tolerated.  No flexion limit.  Do not cross knees.  May shower.        Recent Labs   Lab Test 01/25/24  0537 10/05/23  0940 02/01/23  0944 08/11/22  0958   HGB 8.2* 13.5 12.3* 11.9*   PLT  --  235 249 220     Recent Labs   Lab Test 01/25/24  0537 11/02/23  0901 10/05/23  0940 02/01/23  0944 08/11/22  0958   POTASSIUM 4.6 4.0  --   --  3.9   BUN 22.6 15.0  --   --  14   CR 1.21* 1.05 1.13   < > 0.95    < > = values in this interval not displayed.     No results for input(s): \"INR\" in the last 94070 hours.    ALLERGIES:  Fluconazole            Discharge Medications:     Current Discharge Medication List        START taking these medications    Details   !! acetaminophen (TYLENOL) 325 MG tablet Take 3 tablets (975 mg) by mouth every 8 hours as needed for other (mild pain)  Qty: 100 tablet, Refills: 0    Associated Diagnoses: History of total left hip replacement      aspirin (ASA) 325 MG EC tablet Take 1 tablet (325 mg) by mouth 2 times daily for 40 days  Qty: 80 tablet, Refills: 0    Associated Diagnoses: History of total left hip replacement      oxyCODONE (ROXICODONE) 5 MG tablet Take 1-2 tablets " (5-10 mg) by mouth every 4 hours as needed for moderate to severe pain  Qty: 26 tablet, Refills: 0    Associated Diagnoses: History of total left hip replacement      senna-docusate (SENOKOT-S/PERICOLACE) 8.6-50 MG tablet Take 1-2 tablets by mouth 2 times daily Take while on oral narcotics to prevent or treat constipation.  Qty: 20 tablet, Refills: 0    Comments: While taking narcotics  Associated Diagnoses: History of total left hip replacement       !! - Potential duplicate medications found. Please discuss with provider.        CONTINUE these medications which have NOT CHANGED    Details   !! acetaminophen (TYLENOL) 500 MG tablet Take 500-1,000 mg by mouth every 6 hours as needed for mild pain      Cholecalciferol (VITAMIN D-3) 125 MCG (5000 UT) TABS Take 1 tablet by mouth daily      FIBER PO Melaleuca Fiberwise Take daily as directed      HYDROcodone-acetaminophen (NORCO) 5-325 MG tablet Take 1 tablet by mouth in the morning and 1 tablet in the evening.      hydroxychloroquine (PLAQUENIL) 200 MG tablet Take 1 tablet (200 mg) by mouth 2 times daily  Qty: 180 tablet, Refills: 1    Associated Diagnoses: PMR (polymyalgia rheumatica) (H24)      ibuprofen (ADVIL/MOTRIN) 200 MG capsule Take 400 mg by mouth every 6 hours as needed for moderate pain      MAGNESIUM PO Take 1 tablet by mouth daily Melaleuca      !! Multiple Vitamin (MULTIVITAMIN PO) Take 1 tablet by mouth 2 times daily NutriDyn Essential Minerals      !! Multiple Vitamin (STRESS FORMULA PO) NutriDyn Stress Essentials Adrenal B1B6 Take 1 tab with each meal      Multiple Vitamins-Minerals (IMMUNE SUPPORT PO) Take 1 tablet by mouth 2 times daily NutriDyn Immune Resilience      naloxone (NARCAN) 4 MG/0.1ML nasal spray Spray 4 mg in nostril once as needed for opioid reversal      Nutritional Supplements (DHEA PO) Bio-Srinivas [place]. 1 spray daily      Omega-3 Fatty Acids (OMEGA 3 PO) Melaleuca Omega 3  Take one cap twice daily      PROTEIN PO Melaleuca Protein  Drink, one daily      !! UNABLE TO FIND MEDICATION NAME: NutriDyn Testro Balance Take 1 tab daily      !! UNABLE TO FIND MEDICATION NAME: Malaleuca Good Zymes Take 1 with each meal      !! UNABLE TO FIND MEDICATION NAME: Melaleuca Prostavan 1 tab daily      !! UNABLE TO FIND MEDICATION NAME: Melaleuca Nutraview Take one tab daily      !! UNABLE TO FIND MEDICATION NAME: Melaleuca Replenex Take 1 tab twice daily      !! UNABLE TO FIND MEDICATION NAME: Melaleuca Multivitamin Take 1 tab twice daily      vitamin C (ASCORBIC ACID) 1000 MG TABS Take 1,000-2,000 mg by mouth in the morning.       !! - Potential duplicate medications found. Please discuss with provider.          Nba Dick M.D.  Department of Orthopaedic Surgery  [facility]

## 2024-01-29 DIAGNOSIS — Z96.642 HISTORY OF TOTAL LEFT HIP REPLACEMENT: ICD-10-CM

## 2024-01-29 RX ORDER — OXYCODONE HYDROCHLORIDE 5 MG/1
5-10 TABLET ORAL EVERY 6 HOURS PRN
Qty: 12 TABLET | Refills: 0 | Status: SHIPPED | OUTPATIENT
Start: 2024-01-29

## 2024-01-29 NOTE — TELEPHONE ENCOUNTER
Prescription for oxycodone, 5mg, 1-2 tabs q6h as needed pain, #12, no refills, escribed to [facility].    Johnny Hampton M.D., M.S.  Dept. of Orthopaedic Surgery  [facility]

## 2024-01-30 ENCOUNTER — DOCUMENTATION ONLY (OUTPATIENT)
Dept: OTHER | Facility: CLINIC | Age: 79
End: 2024-01-30
Payer: COMMERCIAL

## 2024-02-05 NOTE — LETTER
"8/11/2022       RE: Merlin J Weinhold  [address] [facility] [address]     Dear Colleague,    Thank you for referring your patient, Merlin J Weinhold, to the [facility] [place] at [facility]. Please see a copy of my visit note below.      Outpatient Rheumatology follow-up    Name: Merlin Weinhold    MRN [id number]   Today's date: 8/11/2022         Reason for follow-up: PMR   Primary care physician: Cristy Connolly MD             Assessment & Plan:   76 year old male who co-managed by rheumatologist Dr Smith  for PMR on HCQ and steroids.    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  This is supported by his labs today which show normal ESR and CRP.  His upper back pain which comes on later in the afternoon does not sound inflammatory nor consistent with PMR. HCQ and steroid plan is in place by Dr Smith.  -Continue HCQ 200mg BID  -Continue methylprednisolone 4mg once daily with taper plan outlined by Dr Smith  -Labs today  -Follow-up in 6 months     #Positive GEORGINA: Reviewing his serologies obtained with his visit today, his GEORGINA is 1:1280 in a homogeneous pattern. SSA, SSB, smith, RNP, dsDNA, C4 are all negative/normal. His C3 is borderline low of which in this setting I am not concerned.  A prior work-up in 2/23/22 showed elevated GEORGINA/chromatin/dsDNA/SSA which I would note was drawn around the time he was admitted to the hospital for severe hyponatremia secondary to a \"cleanse\" prescribed by a naturopath- more on this below. I suspect that his positive antibodies at that time, which can be seen in the setting of medications/supplements etc, and are negative now may have been secondary to his exposure to whatever was in that.    High risk medication use: Hydroxychloroquine  -Risks and benefits of HCQ discussed again today to include " rash (including severe rash/SJS/TEN), HA, GI upset, hepatoxicity, retinal toxicity, need for yearly screening OCT exam, need for CBC, CMP, ESR, CRP for routine screening drug toxicity labs among others. Patient is agreeable to continue.  -We do not have records for a baseline screening OCT exam. While prescribed by Dr Smith, will follow-up with him on this at his next visit  -Routine screening drug toxicity labs obtained today to include CBC, CMP which did not show any evidence of drug toxicity.  We will continue routine drug toxicity screening labs every 6 months    Silvestre Arreguin MD  Rheumatology        Subjective:   Interval history 8/11/2022   has pain in the upper back in the afternoons, between 3-4pm. Lasts for the rest of the day. Takes vicodin in the afternoon another 8pm. In the AM it is gone. His current dose of steroids is 1 tablet of methylprednisolone 4mg each morning. Has been on this dose for about 10 weeks. Shortly after decreasing to that dose, he noticed that his shoulder symptoms were some worse.  Though not by much. No side effects from HCQ. No GI complaints any longer. Up about 16 pounds. No GCA symptoms. Occasional hip discomfort after a short walk. Able to get up out of a chair without any difficulty. Shoulder ROM is much improved. Overall he is functionally improved from his PMR treatment. He and his wife are pleased with him improvement during this time. No interval infections. no fevers chills night sweats.  No new rashes.  14 point review of systems collected and otherwise negative.    HPI from initial consultation 4/11/2022 Jan/Feb, both shoulders became painful. Could lift his arms above his shoulders. Went to PT for 4 weeks, twice weekly and his ROM inproved, though pain did not resolve. He went back to PT for another 2-3 weeks. And pain still did not resolve. And plan was to continue PT at home and was doing this daily 5x/week. He then had left hip pain as well, and so had  "injection into left hip then bilateral shoulders as well. Though this only lasted for days to weeks. So he had second injection into right shoulder, which again, not much helpful. Then in June, had hyponatremia and was admitted to [facility]. At the end of august was started on prednisone with taper. Medrol dose pack. Did this ultimately 3 times. He had noticeable improvement with each of these. Then due to this improvement he was started on 8mg of medrol. Had noted small/mild stomach pain starting in the summer of 2021. Had small glass of wine in nov which was some worse. Then in January this started to escalate. In Junuary he had also developed significant hives (previously seen in sept when on fluconazole). He had much worsening of stomach pain during this episode of hives. Small amount of eating or tums \"took the edge off\" of his stomach pain. Appetite was much reduced during this period. Pain was so severe that evening that he got up to take advil and had a fall (hit right shoulder/head). Was seen by PCP/given pepcid. Had CT head without bleed.     In June, was taking 6-8 advil per day. From June to Feb was taking 6-8 advil per day. This was with his steroid that was started in august.      Duodenal Ulcer \"benign appearing, but it is a bit unual in that it is completely circumferential with only 2mm of witdt and mild associated narrowing of the lumen\".     Has lost about 55 pounds from Jan 2021 to today. 10 with COVID. 10 with cleanse with natropath. It was on day 8 of this that he ended up in the hospital due to hyponatremia.     No HA. No vision change. No scalp tenderness. No jaw claudication symptoms. No dry cough. Has chills. No fevers/ night sweats. No epistaxis/hemoptysis. Has had canker sores on and off for years (seconary to peanuts). No blood in stool. No chest pain/shortness of breath. Abdominal symptoms above. No red/hot/swollen joints. No new rash. Has ulcers from pressure sores. Slowly " "healing. These are improving slowly. No symptoms consistent with dactylitis. No symptoms consistent with sclerodactyy.     Past Medical History  PVD  Hearing loss  Osteoarthritis in hands    Past Surgical History  Appenix  Cyst removal    Medications  Current Outpatient Medications   Medication     acetylcysteine (NAC) 600 MG CAPS capsule     Cholecalciferol (VITAMIN D-3) 125 MCG (5000 UT) TABS     famotidine (PEPCID) 20 MG tablet     Ferrous Sulfate (IRON PO)     FIBER PO     HYDROcodone-acetaminophen (NORCO) 5-325 MG tablet     hydroxychloroquine (PLAQUENIL) 200 MG tablet     MAGNESIUM PO     methocarbamol (ROBAXIN) 500 MG tablet     methylPREDNISolone (MEDROL DOSEPAK) 4 MG tablet therapy pack     Multiple Vitamin (MULTIVITAMIN PO)     Multiple Vitamin (STRESS FORMULA PO)     Multiple Vitamins-Minerals (IMMUNE SUPPORT PO)     Nutritional Supplements (DHEA PO)     Omega-3 Fatty Acids (OMEGA 3 PO)     omeprazole (PRILOSEC) 40 MG DR capsule     PROTEIN PO     PROTEIN PO     UNABLE TO FIND     UNABLE TO FIND     UNABLE TO FIND     UNABLE TO FIND     UNABLE TO FIND     UNABLE TO FIND     UNABLE TO FIND     UNABLE TO FIND     UNABLE TO FIND     UNABLE TO FIND     vitamin C (ASCORBIC ACID) 1000 MG TABS     No current facility-administered medications for this visit.     2.5 tabs. Decreasing by half every 2 weeks.   May 10th for liver  Oberto June 14th    Allergies     Allergies   Allergen Reactions     Fluconazole Hives, Itching and Rash     Hives like blisters without fluid arms and back and legs and chest       Family History  Strong family history of CA (lung or breast in 4 of 6 of his immediate family)    Social History  Prior smoker, quit 49. No ETOH since nov. No hx of drug use now or in the past.      Objective:   /59 (BP Location: Right arm, Patient Position: Chair, Cuff Size: Adult Regular)   Pulse 63   Ht 1.702 m (5' 7\")   Wt 66.6 kg (146 lb 14.4 oz)   SpO2 93%   BMI 23.01 kg/m    Physical " exam:  GEN: sitting up unassisted, NAD, wife present with him  HEENT: No facial rash, sclera clear, no oral or nasal ulcers, no inflammatory nasal bridge/external ear changes, good saliva pool, looks   CV: RRR, no m/r/g, mild hypotension  Pulm: CTAB no crackles wheezing ronchi  Abdomen: soft, non tender, not distended, no masses  Extremities: full active and passive ROM of bilateral shoulders/elbows, wrists. Can make full fist bilaterally. Knees/ankles/MTPs unremarkable. No synovitis of these joints. No dactylitis. No digital pitting. No nail changes. No sclerodactyly. Able to stand unassisted without the use of his arms from a seated position.  Skin: No acute cutaneous changes     Labs:  WBC Count   Date Value Ref Range Status   08/11/2022 5.6 4.0 - 11.0 10e3/uL Final     Hemoglobin   Date Value Ref Range Status   08/11/2022 11.9 (L) 13.3 - 17.7 g/dL Final     Platelet Count   Date Value Ref Range Status   08/11/2022 220 150 - 450 10e3/uL Final     Creatinine   Date Value Ref Range Status   08/11/2022 0.95 0.66 - 1.25 mg/dL Final     Lab Results   Component Value Date    ALKPHOS 65 08/11/2022     AST   Date Value Ref Range Status   08/11/2022 22 0 - 45 U/L Final     Lab Results   Component Value Date    ALT 20 08/11/2022     Erythrocyte Sedimentation Rate   Date Value Ref Range Status   08/11/2022 19 0 - 20 mm/hr Final     CRP Inflammation   Date Value Ref Range Status   08/11/2022 <2.9 0.0 - 8.0 mg/L Final     UA RESULTS:  Recent Labs   Lab Test 04/12/22  1115   COLOR Yellow   APPEARANCE Clear   URINEGLC Negative   URINEBILI Negative   URINEKETONE Negative   SG 1.023   UBLD Negative   URINEPH 5.0   PROTEIN Negative   NITRITE Negative   LEUKEST Negative   RBCU 1   WBCU 1      GEORGINA pattern 1   Date Value Ref Range Status   04/12/2022 Homogeneous  Final     DNA (ds) Antibody   Date Value Ref Range Status   04/12/2022 9.7 <10.0 IU/mL Final     Comment:     Negative     RNP Antibody IgG   Date Value Ref Range Status    04/12/2022 Negative Negative Final     3/25/2022  Serum electrophoresis no paraprotein identified    3/22/2022  Ferritin 563  Creatinine 0.62  AST 24  ALT 20  CRP 4  ESR 21    3/12/2022  WBC 4.3  Hemoglobin 9.5  Platelet 264    2/23/2022  Double-stranded DNA 38  SSA 1.3  Antichromatin antibody greater than 8  RNP negative  M[place]onado negative  SCL 70 -  SSB negative  Billie 1 -  Anticentromere negative  Hep C antibody negative    8/25/2021  Lyme IgG and IgM negative  CK normal at 40  CCP antibody negative    Imaging:  EXAM: CT CHEST WITH CONTRAST; CT ABDOMEN AND PELVIS WITH   CONTRAST     CLINICAL INFORMATION: Weight loss, abdominal pain     TECHNICAL INFORMATION:  The patient was given 3 cups of   water containing a total of 50 mL Omnipaque 300 to drink   prior to the exam.  After IV injection of 100 mL of   Omnipaque 300, axial sections were obtained from the   thoracic inlet through the symphysis pubis.  Reformations   were obtained in the coronal and sagittal planes.  No   immediate complications were seen.       COMPARISON: Ultrasound 8/26/2021     INTERPRETATION:     Chest:     The lungs are clear.  No suspicious pulmonary nodules or   masses.     Heart size is normal; aortic and coronary atherosclerosis   noted.  No pericardial or pleural effusion.     No intrathoracic lymphadenopathy by size criteria.     Abdomen:     The liver, bile ducts, pancreas, spleen, adrenal glands, and   kidneys are within normal limits.  There is cholelithiasis   without evidence of cholecystitis.  Visualized intestines   are unremarkable.     No abdominopelvic lymphadenopathy by size criteria.   Negative for ascites.  There is atherosclerosis of the aorta   and its branches without aneurysm.     Pelvis:     The prostate measures 4.1 x 5.0 cm.     Urinary bladder is partially collapsed but grossly normal.     Musculoskeletal:     No acute or suspicious bony abnormality.     CONCLUSION:     1. No CT findings to explain the patient's  symptoms.   2. Prostatic enlargement.                  Again, thank you for allowing me to participate in the care of your patient.      Sincerely,    Silvestre Arreguin MD

## 2024-02-12 ENCOUNTER — OFFICE VISIT (OUTPATIENT)
Dept: ORTHOPEDICS | Facility: CLINIC | Age: 79
End: 2024-02-12
Payer: COMMERCIAL

## 2024-02-12 VITALS
HEIGHT: 67 IN | HEART RATE: 91 BPM | SYSTOLIC BLOOD PRESSURE: 114 MMHG | WEIGHT: 160 LBS | DIASTOLIC BLOOD PRESSURE: 68 MMHG | BODY MASS INDEX: 25.11 KG/M2

## 2024-02-12 DIAGNOSIS — Z96.642 HISTORY OF TOTAL LEFT HIP REPLACEMENT: Primary | ICD-10-CM

## 2024-02-12 PROCEDURE — 99024 POSTOP FOLLOW-UP VISIT: CPT | Performed by: ORTHOPAEDIC SURGERY

## 2024-02-12 NOTE — PROGRESS NOTES
Follow up left total hip arthroplasty on 1/24/24 with direct anterior approach .    He complains of occasional pain..   Wound is healing well.  There is not a raw area at upper wound.  No warmth or erythema.  He is using cane to walk.      Assessment:  left total hip arthroplasty doing well.  Plan:  No flexion restrictions.  Start scar massage with vitamin-E cream.   Use aspirin for 4 more weeks.  Medication renewal:  None # .  Return to clinic 4 weeks with xray - low AP pelvis and lateral left hip.

## 2024-02-12 NOTE — PATIENT INSTRUCTIONS
left total hip arthroplasty doing well.  Plan:  No flexion restrictions.  Start scar massage with vitamin-E cream.   Use aspirin for 4 more weeks.  Medication renewal:  None # .  Return to clinic 4 weeks     Ok for driving.

## 2024-02-26 ENCOUNTER — DOCUMENTATION ONLY (OUTPATIENT)
Dept: ORTHOPEDICS | Facility: CLINIC | Age: 79
End: 2024-02-26
Payer: COMMERCIAL

## 2024-02-26 DIAGNOSIS — R26.89 IMPAIRED GAIT AND MOBILITY: Primary | ICD-10-CM

## 2024-03-11 ENCOUNTER — OFFICE VISIT (OUTPATIENT)
Dept: ORTHOPEDICS | Facility: CLINIC | Age: 79
End: 2024-03-11
Payer: COMMERCIAL

## 2024-03-11 ENCOUNTER — ANCILLARY PROCEDURE (OUTPATIENT)
Dept: GENERAL RADIOLOGY | Facility: CLINIC | Age: 79
End: 2024-03-11
Attending: ORTHOPAEDIC SURGERY
Payer: COMMERCIAL

## 2024-03-11 VITALS
HEART RATE: 95 BPM | HEIGHT: 67 IN | BODY MASS INDEX: 25.11 KG/M2 | DIASTOLIC BLOOD PRESSURE: 74 MMHG | SYSTOLIC BLOOD PRESSURE: 136 MMHG | WEIGHT: 160 LBS | RESPIRATION RATE: 18 BRPM

## 2024-03-11 DIAGNOSIS — Z96.642 HISTORY OF TOTAL LEFT HIP REPLACEMENT: ICD-10-CM

## 2024-03-11 DIAGNOSIS — Z96.642 HISTORY OF TOTAL LEFT HIP REPLACEMENT: Primary | ICD-10-CM

## 2024-03-11 PROCEDURE — 99024 POSTOP FOLLOW-UP VISIT: CPT | Performed by: ORTHOPAEDIC SURGERY

## 2024-03-11 PROCEDURE — 73501 X-RAY EXAM HIP UNI 1 VIEW: CPT | Mod: TC | Performed by: RADIOLOGY

## 2024-03-11 NOTE — PATIENT INSTRUCTIONS
6 weeks status post total hip arthroplasty.  Plan:  no flexion limits.  Use cane if there is any limp.  Return to clinic 6 weeks to check walking.    You may stop the aspirin 325 mg twice daily.  Start abductor strengthening.         Leg strengthening:  Hold onto the sink with surgical leg toward the sink.  Lift surgical leg out to touch the wall with the heel.  Lift 10-15 times, twice a day.  Keep on until you don't limp.

## 2024-03-11 NOTE — PROGRESS NOTES
6 week follow up left total hip arthroplasty on 1/24/24 with direct anterior approach.    He has no complaints  He  is using a cane to walk.    Wound is healed well.  He has mild abductor lurch.  Range of motion internal rotation 15 degrees, external rotation 45 degrees, rest of ROM normal.     Xray: Low AP pelvis and lateral of left hip was performed today.   The position of components is excellent    Leg length :equal to slightly long.      Medication renewal:  None.    Assessment:  6 weeks status post left total hip arthroplasty.  Plan:  no flexion limits.  Start abductor strengthening.  Exercises demonstrated.  Use cane if there is any  limp.  Return to clinic 4-6 weeks to check walking.

## 2024-04-13 NOTE — LETTER
2/12/2024         RE: Merlin J Weinhold  [address] [facility] [address]        Dear Colleague,    Thank you for referring your patient, Merlin J Weinhold, to the [facility]. Please see a copy of my visit note below.    Follow up left total hip arthroplasty on 1/24/24 with direct anterior approach .    He complains of occasional pain..   Wound is healing well.  There is not a raw area at upper wound.  No warmth or erythema.  He is using cane to walk.      Assessment:  left total hip arthroplasty doing well.  Plan:  No flexion restrictions.  Start scar massage with vitamin-E cream.   Use aspirin for 4 more weeks.  Medication renewal:  None # .  Return to clinic 4 weeks with xray - low AP pelvis and lateral left hip.       Again, thank you for allowing me to participate in the care of your patient.        Sincerely,        Nba Dick MD   Statement Selected

## 2024-05-06 ENCOUNTER — OFFICE VISIT (OUTPATIENT)
Dept: ORTHOPEDICS | Facility: CLINIC | Age: 79
End: 2024-05-06
Payer: COMMERCIAL

## 2024-05-06 VITALS
SYSTOLIC BLOOD PRESSURE: 103 MMHG | DIASTOLIC BLOOD PRESSURE: 61 MMHG | WEIGHT: 160 LBS | BODY MASS INDEX: 25.11 KG/M2 | HEIGHT: 67 IN

## 2024-05-06 DIAGNOSIS — Z96.642 HISTORY OF TOTAL LEFT HIP REPLACEMENT: Primary | ICD-10-CM

## 2024-05-06 PROCEDURE — 99024 POSTOP FOLLOW-UP VISIT: CPT | Performed by: ORTHOPAEDIC SURGERY

## 2024-05-06 RX ORDER — AMOXICILLIN 500 MG/1
2000 CAPSULE ORAL ONCE
Qty: 8 CAPSULE | Refills: 4 | Status: SHIPPED | OUTPATIENT
Start: 2024-05-06 | End: 2024-05-06

## 2024-05-06 NOTE — PROGRESS NOTES
3 month follow up left total hip arthroplasty on 1/24/24 with direct anterior approach..    He has no complaints  Pain is minimal  To walk he is using a cane for long walks.    Wound is healed well.  He has no abductor lurch.  He is doing abductor exercises appropriately.  Range of motion internal rotation 30 degrees, external rotation 60 degrees, rest of ROM normal.       Assessment:  3 months status post left total hip arthroplasty.  Plan:  Continue strengthening.   Use cane as needed.  Take amoxacillin 4 tablets before dental appointment. I would recommend this forever.  Return to clinic at 1 year from surgery ( January ) with low AP pelvis and left lateral hip x-rays.

## 2024-05-06 NOTE — PATIENT INSTRUCTIONS
3 months status post left total hip arthroplasty.  Plan:  Continue strengthening.   Use cane as needed.  Take amoxacillin 4 tablets before dental appointment. I would recommend this for at least a year  Return to clinic at 1 year from surgery ( January ) with low AP pelvis and left lateral hip x-rays.

## 2024-05-06 NOTE — LETTER
5/6/2024         RE: Merlin J Weinhold  [address] [facility] [address]        Dear Colleague,    Thank you for referring your patient, Merlin J Weinhold, to the [facility]. Please see a copy of my visit note below.        3 month follow up left total hip arthroplasty on 1/24/24 with direct anterior approach..    He has no complaints  Pain is minimal  To walk he is using a cane for long walks.    Wound is healed well.  He has no abductor lurch.  He is doing abductor exercises appropriately.  Range of motion internal rotation 30 degrees, external rotation 60 degrees, rest of ROM normal.       Assessment:  3 months status post left total hip arthroplasty.  Plan:  Continue strengthening.   Use cane as needed.  Take amoxacillin 4 tablets before dental appointment. I would recommend this forever.  Return to clinic at 1 year from surgery ( January ) with low AP pelvis and left lateral hip x-rays.       Again, thank you for allowing me to participate in the care of your patient.        Sincerely,        Nba Dick MD

## 2024-05-15 NOTE — PROGRESS NOTES
Outpatient Rheumatology follow-up    Name: Merlin Weinhold    MRN [id number]   Today's date: 05/16/2024    Date of last visit: 10/05/23         Reason for follow-up: PMR   Primary care physician: Cristy Connolly MD             Assessment & Plan:     #PMR: His disease continues to be in remission by history and exam today on HCQ 200mg BID. Was previously on methylprednisolone 4 mg daily but tapered offf September 2022 with minimal flare in symptoms.  Exam shows point tenderness anterior shoulder; impingement signs are negative; there is painless full range of motion of the left shoulder.    Labs from November 2023 and January 2024 showed hemoglobin of 8.0.  Sedimentation rate was normal.    # Left hip pain : Improved, status post left total hip replacement January 2024     Discussion: Patient has had no symptoms directly referable to polymyalgia rheumatica since discontinuation of prednisone in late 2022.  Hydroxychloroquine has been used without any symptoms recurrence for approximately 12 months.  I recommend continuing the drug for another 3 months, then it may be stopped without concern for tapering or rebound.  I advised patient to report to rheumatology if symptoms of polymyalgia rheumatica occur, or if cranial symptoms (headache, visual changes) occur.    We discussed:    Diagnosis:  1.  Intermittent left shoulder pain: I doubt that PMR or inflammatory arthritis are the cause.  Plan continue Voltaren gel, heat, continue exercise involving the shoulder.  2.  Polymyalgia rheumatica: No symptoms of current activity.  Continue off prednisone (last dose 2022), continue hydroxychloroquine for another 3 months.  3.  Transiently elevated creatinine around the time of hip surgery: Repeat testing for kidney function.    Plan:  1.  Continue hydroxychloroquine 400 mg once daily for 3 months.  Then discontinue and monitor for symptom recurrence.  2.  Blood work for kidney function, liver function, blood counts,  "inflammation markers toda      #Chronic cervical/thoracic back pain: Pain is low-grade, manageable with symptomatic treatment.    #Positive GEORGINA: No symptoms referable to GEORGINA associated disorder today.  Prior serologies from show an GEORGINA of 1:1280 in a homogeneous pattern. SSA, SSB, smith, RNP, dsDNA, C4 are all negative/normal. His C3 is borderline low of which in this setting I am not concerned.  A prior work-up in 2/23/22 showed elevated GEORGINA/chromatin/dsDNA/SSA which I would note was drawn around the time he was admitted to the hospital for severe hyponatremia secondary to a \"cleanse\" prescribed by a naturopath- more on this below. I suspect that his positive antibodies at that time, which can be seen in the setting of medications/supplements etc, and are negative now may have been secondary to his exposure to whatever was in that.    #High risk medication use: Hydroxychloroquine  -No drug toxicity from HCQ identified by review of his most recent labs results drawn today to include CBC with diff, ESR, CRP, AST, ALT, creatinine.  -- He will not need ophthalmology evaluation if hydroxychloroquine is discontinued within 16 months of initiation.    RTC 9 months    Ethan Alicia MD  Staff Rheumatologist, [facility]    On the day of the encounter, a total of 30 minutes was spent in chart review, and in counseling and coordination of care, regarding the patient's complex medical problem of polymyalgia rheumatica, left shoulder pain, osteoarthritis, left hip    The longitudinal plan of care for the diagnosis(es)/condition(s) as documented were addressed during this visit. Due to the added complexity in care, I will continue to support Merlin in the subsequent management and with ongoing continuity of care.    Orders Placed This Encounter   Procedures    Comprehensive metabolic panel    CBC with platelets    Erythrocyte sedimentation rate auto    CRP inflammation          Subjective:     Patient presents for followup of " "PMR and hip pain. He was last seen by Dr. Arreguin in . L trochanteric pain symdrome and quiescent PMR were noted. Hydroxychloroquine was continued.    Interval history May 16, 2024    He notes ongoing L shoulder pain, with tenderness at the anterior shoulder.  He uses mallaleuca and voltaren with some help; chiropracty gives temporary relief.  Also notes neck pain which is less intense than before.  Spouse reports that aptient is much more active than when PMR was active. He tires more easitly, but is able to garden and be physically active much more.  No significant morning stiffness. Uses \"pain creams\" more in evening to help with sleep.    L hip is much better since 1-2024 L THR.  He is able to walk a block, but still has pain along side the hip.    Occasionally HA, pain goes away with a couple aspirin.  No recent visual changes.      10/05/23 hx Dr. Arreguin    Pt has been doing well overall. His biggest concern today is left hip pain. He was diagnosed with moderate left hip OA in 2021. An initial intraarticular corticosteroid injection years ago provided significant relief. The most recent injection in 4/2023 provided minimal relief. He notes that the pain, localized on the lateral aspect of the left hip, has worsened in the last few weeks. He is following with physical therapy and has had one visit so far.    Otherwise, he has had not PMR flares since our last visit. Upper back pain has remained unchanged. He localizes the pain just inferior to bilateral scapula. This pain usually worsens the day after strenuous activity such as doing yard work. He is able to perform all ADLs without limitation. Treats these symptoms with over the counter topical analgesics. No proximal muscle weakness. No other joint pain, stiffness, or swelling. No morning stiffness. Denies headache, vision changes, or jaw pain. No fevers, chills or weight loss. Continues to have cold intolerance, especially in the winter. He " created a device in his basement to divert the heat from fireplace and chimney in the rest of the room to keep him warm. Denies hands changing colors, although wife notes that they can be purple at times.    14 point review of systems collected and negative if not documented above.      Interval History 2/1/2023:  Pt reports he has been doing well since his last visit. Tapered completely off steroids around September of 2022 (unsure of exact date), and perhaps noted a slight increase in upper back pain but otherwise feels symptoms have been controlled since last visit. No issues with ROM, strength in the shoulders, hips like before, tolerating ADLs well. No morning stiffness. No scalp tenderness, headaches, changes in vision, fevers, chills, night sweats, joint pain, oral lesions. No GI symptoms. Has been tolerating Plaquenil well, no changes in vision and had eye exam with [facility] in December. Biggest concern today is cold intolerance, worse in the hands, but also more generalized in the rest of the body. Pt reports hands feel constantly cold, which is exacerbated by cold exposure when outside. No numbness, paresthesias. No color changes to the digits.     Interval history 8/11/2022   has pain in the upper back in the afternoons, between 3-4pm. Lasts for the rest of the day. Takes vicodin in the afternoon another 8pm. In the AM it is gone. His current dose of steroids is 1 tablet of methylprednisolone 4mg each morning. Has been on this dose for about 10 weeks. Shortly after decreasing to that dose, he noticed that his shoulder symptoms were some worse.  Though not by much. No side effects from HCQ. No GI complaints any longer. Up about 16 pounds. No GCA symptoms. Occasional hip discomfort after a short walk. Able to get up out of a chair without any difficulty. Shoulder ROM is much improved. Overall he is functionally improved from his PMR treatment. He and his wife are pleased with him improvement  "during this time. No interval infections. no fevers chills night sweats.  No new rashes.  14 point review of systems collected and otherwise negative.    HPI from initial consultation 4/11/2022 Jan/Feb, both shoulders became painful. Could lift his arms above his shoulders. Went to PT for 4 weeks, twice weekly and his ROM inproved, though pain did not resolve. He went back to PT for another 2-3 weeks. And pain still did not resolve. And plan was to continue PT at home and was doing this daily 5x/week. He then had left hip pain as well, and so had injection into left hip then bilateral shoulders as well. Though this only lasted for days to weeks. So he had second injection into right shoulder, which again, not much helpful. Then in June, had hyponatremia and was admitted to [facility]. At the end of august was started on prednisone with taper. Medrol dose pack. Did this ultimately 3 times. He had noticeable improvement with each of these. Then due to this improvement he was started on 8mg of medrol. Had noted small/mild stomach pain starting in the summer of 2021. Had small glass of wine in nov which was some worse. Then in January this started to escalate. In Junuary he had also developed significant hives (previously seen in sept when on fluconazole). He had much worsening of stomach pain during this episode of hives. Small amount of eating or tums \"took the edge off\" of his stomach pain. Appetite was much reduced during this period. Pain was so severe that evening that he got up to take advil and had a fall (hit right shoulder/head). Was seen by PCP/given pepcid. Had CT head without bleed.     In June, was taking 6-8 advil per day. From June to Feb was taking 6-8 advil per day. This was with his steroid that was started in august.      Duodenal Ulcer \"benign appearing, but it is a bit unual in that it is completely circumferential with only 2mm of witdt and mild associated narrowing of the lumen\".     Has " lost about 55 pounds from Jan 2021 to today. 10 with COVID. 10 with cleanse with natropath. It was on day 8 of this that he ended up in the hospital due to hyponatremia.     No HA. No vision change. No scalp tenderness. No jaw claudication symptoms. No dry cough. Has chills. No fevers/ night sweats. No epistaxis/hemoptysis. Has had canker sores on and off for years (seconary to peanuts). No blood in stool. No chest pain/shortness of breath. Abdominal symptoms above. No red/hot/swollen joints. No new rash. Has ulcers from pressure sores. Slowly healing. These are improving slowly. No symptoms consistent with dactylitis. No symptoms consistent with sclerodactyy.     Past Medical History  PVD  Hearing loss  Osteoarthritis in hands    Past Surgical History  Appendix  Cyst removal    Medications  Current Outpatient Medications   Medication Sig Dispense Refill    acetaminophen (TYLENOL) 325 MG tablet Take 3 tablets (975 mg) by mouth every 8 hours as needed for other (mild pain) 100 tablet 0    acetaminophen (TYLENOL) 500 MG tablet Take 500-1,000 mg by mouth every 6 hours as needed for mild pain      Cholecalciferol (VITAMIN D-3) 125 MCG (5000 UT) TABS Take 1 tablet by mouth daily      FIBER PO Melaleuca Fiberwise Take daily as directed      hydroxychloroquine (PLAQUENIL) 200 MG tablet Take 1 tablet (200 mg) by mouth 2 times daily 180 tablet 1    ibuprofen (ADVIL/MOTRIN) 200 MG capsule Take 400 mg by mouth every 6 hours as needed for moderate pain      MAGNESIUM PO Take 1 tablet by mouth daily Melaleuca      Multiple Vitamin (MULTIVITAMIN PO) Take 1 tablet by mouth 2 times daily NutriDyn Essential Minerals      Multiple Vitamin (STRESS FORMULA PO) NutriDyn Stress Essentials Adrenal B1B6 Take 1 tab with each meal      Multiple Vitamins-Minerals (IMMUNE SUPPORT PO) Take 1 tablet by mouth 2 times daily NutriDyn Immune Resilience      Nutritional Supplements (DHEA PO) Bio-Srinivas Quincy. 1 spray daily      Omega-3 Fatty Acids  (OMEGA 3 PO) Melaleuca Omega 3  Take one cap twice daily      PROTEIN PO Melaleuca Protein Drink, one daily      UNABLE TO FIND MEDICATION NAME: NutriDyn Testro Balance Take 1 tab daily      UNABLE TO FIND MEDICATION NAME: Jacoby Good Zymes Take 1 with each meal      UNABLE TO FIND MEDICATION NAME: Melaleuca Prostavan 1 tab daily      UNABLE TO FIND MEDICATION NAME: Melaleuca Nutraview Take one tab daily      UNABLE TO FIND MEDICATION NAME: Melaleuca Replenex Take 1 tab twice daily      UNABLE TO FIND MEDICATION NAME: Melaleuca Multivitamin Take 1 tab twice daily      vitamin C (ASCORBIC ACID) 1000 MG TABS Take 1,000-2,000 mg by mouth in the morning.      HYDROcodone-acetaminophen (NORCO) 5-325 MG tablet Take 1 tablet by mouth in the morning and 1 tablet in the evening.      naloxone (NARCAN) 4 MG/0.1ML nasal spray Spray 4 mg in nostril once as needed for opioid reversal      oxyCODONE (ROXICODONE) 5 MG tablet Take 1-2 tablets (5-10 mg) by mouth every 6 hours as needed for moderate to severe pain 12 tablet 0    senna-docusate (SENOKOT-S/PERICOLACE) 8.6-50 MG tablet Take 1-2 tablets by mouth 2 times daily Take while on oral narcotics to prevent or treat constipation. 20 tablet 0     Current Facility-Administered Medications   Medication Dose Route Frequency Provider Last Rate Last Admin    lidocaine 1 % injection 2 mL  2 mL   Sayra Nance, DO   2 mL at 10/17/23 1501    triamcinolone (KENALOG-40) injection 40 mg  40 mg   Sayra Nance, DO   40 mg at 10/17/23 1501     Allergies     Allergies   Allergen Reactions    Fluconazole Hives, Itching and Rash     Hives like blisters without fluid arms and back and legs and chest       Family History  Strong family history of CA (lung or breast in 4 of 6 of his immediate family)    Social History  Prior smoker, quit 49. No ETOH since nov. No hx of drug use now or in the past.      Objective:   /72 (BP Location: Left arm, Patient Position: Sitting, Cuff  Size: Adult Regular)   Pulse 62   Wt 73.3 kg (161 lb 8 oz)   SpO2 99%   BMI 25.29 kg/m    Physical exam:  GEN: sitting up unassisted, NAD, wife present with him  HEENT: No facial rash, sclera clear, no oral or nasal ulcers, no inflammatory nasal bridge/external ear changes  Abdomen: soft, non tender, not distended, no masses  Extremities: full active and passive ROM of bilateral shoulders/elbows, wrists. Can make full fist bilaterally, 5/5  strength. Knees/ankles/MTPs unremarkable. No synovitis of these joints. No dactylitis. No digital pitting. No nail changes. No sclerodactyly. Able to stand unassisted without the use of his arms from a seated position. Significant tenderness to palpation on the lateral aspect of the left hip over the left greater trochanteric bursa. No pain anteriorly over the left groin.    Labs:  WBC Count   Date Value Ref Range Status   10/05/2023 5.5 4.0 - 11.0 10e3/uL Final     Hemoglobin   Date Value Ref Range Status   01/25/2024 8.2 (L) 13.3 - 17.7 g/dL Final     Platelet Count   Date Value Ref Range Status   10/05/2023 235 150 - 450 10e3/uL Final     Creatinine   Date Value Ref Range Status   01/25/2024 1.21 (H) 0.67 - 1.17 mg/dL Final     Lab Results   Component Value Date    ALKPHOS 65 08/11/2022     AST   Date Value Ref Range Status   11/02/2023 24 0 - 45 U/L Final     Comment:     Reference intervals for this test were updated on 6/12/2023 to more accurately reflect our healthy population. There may be differences in the flagging of prior results with similar values performed with this method. Interpretation of those prior results can be made in the context of the updated reference intervals.     Lab Results   Component Value Date    ALT 20 08/11/2022     Erythrocyte Sedimentation Rate   Date Value Ref Range Status   11/02/2023 10 0 - 20 mm/hr Final     CRP Inflammation   Date Value Ref Range Status   08/11/2022 <2.9 0.0 - 8.0 mg/L Final     UA RESULTS:  Recent Labs   Lab Test  04/12/22  1115   COLOR Yellow   APPEARANCE Clear   URINEGLC Negative   URINEBILI Negative   URINEKETONE Negative   SG 1.023   UBLD Negative   URINEPH 5.0   PROTEIN Negative   NITRITE Negative   LEUKEST Negative   RBCU 1   WBCU 1          GEORGINA pattern 1   Date Value Ref Range Status   04/12/2022 Homogeneous  Final     DNA (ds) Antibody   Date Value Ref Range Status   04/12/2022 9.7 <10.0 IU/mL Final     Comment:     Negative     RNP Antibody IgG   Date Value Ref Range Status   04/12/2022 Negative Negative Final     3/25/2022  Serum electrophoresis no paraprotein identified    3/22/2022  Ferritin 563  Creatinine 0.62  AST 24  ALT 20  CRP 4  ESR 21    3/12/2022  WBC 4.3  Hemoglobin 9.5  Platelet 264    2/23/2022  Double-stranded DNA 38  SSA 1.3  Antichromatin antibody greater than 8  RNP negative  M[place]onado negative  SCL 70 -  SSB negative  Billie 1 -  Anticentromere negative  Hep C antibody negative    8/25/2021  Lyme IgG and IgM negative  CK normal at 40  CCP antibody negative    Imaging:  EXAM: CT CHEST WITH CONTRAST; CT ABDOMEN AND PELVIS WITH   CONTRAST     CLINICAL INFORMATION: Weight loss, abdominal pain     TECHNICAL INFORMATION:  The patient was given 3 cups of   water containing a total of 50 mL Omnipaque 300 to drink   prior to the exam.  After IV injection of 100 mL of   Omnipaque 300, axial sections were obtained from the   thoracic inlet through the symphysis pubis.  Reformations   were obtained in the coronal and sagittal planes.  No   immediate complications were seen.       COMPARISON: Ultrasound 8/26/2021     INTERPRETATION:     Chest:     The lungs are clear.  No suspicious pulmonary nodules or   masses.     Heart size is normal; aortic and coronary atherosclerosis   noted.  No pericardial or pleural effusion.     No intrathoracic lymphadenopathy by size criteria.     Abdomen:     The liver, bile ducts, pancreas, spleen, adrenal glands, and   kidneys are within normal limits.  There is cholelithiasis    without evidence of cholecystitis.  Visualized intestines   are unremarkable.     No abdominopelvic lymphadenopathy by size criteria.   Negative for ascites.  There is atherosclerosis of the aorta   and its branches without aneurysm.     Pelvis:     The prostate measures 4.1 x 5.0 cm.     Urinary bladder is partially collapsed but grossly normal.     Musculoskeletal:     No acute or suspicious bony abnormality.     CONCLUSION:     1. No CT findings to explain the patient's symptoms.   2. Prostatic enlargement.

## 2024-05-16 ENCOUNTER — LAB (OUTPATIENT)
Dept: LAB | Facility: CLINIC | Age: 79
End: 2024-05-16
Payer: COMMERCIAL

## 2024-05-16 ENCOUNTER — OFFICE VISIT (OUTPATIENT)
Dept: RHEUMATOLOGY | Facility: CLINIC | Age: 79
End: 2024-05-16
Payer: COMMERCIAL

## 2024-05-16 VITALS
OXYGEN SATURATION: 99 % | WEIGHT: 161.5 LBS | BODY MASS INDEX: 25.29 KG/M2 | DIASTOLIC BLOOD PRESSURE: 72 MMHG | SYSTOLIC BLOOD PRESSURE: 128 MMHG | HEART RATE: 62 BPM

## 2024-05-16 DIAGNOSIS — M35.3 PMR (POLYMYALGIA RHEUMATICA) (H): Primary | ICD-10-CM

## 2024-05-16 DIAGNOSIS — M35.3 PMR (POLYMYALGIA RHEUMATICA) (H): ICD-10-CM

## 2024-05-16 LAB
ERYTHROCYTE [DISTWIDTH] IN BLOOD BY AUTOMATED COUNT: 12.7 % (ref 10–15)
ERYTHROCYTE [SEDIMENTATION RATE] IN BLOOD BY WESTERGREN METHOD: 11 MM/HR (ref 0–20)
HCT VFR BLD AUTO: 40 % (ref 40–53)
HGB BLD-MCNC: 13.1 G/DL (ref 13.3–17.7)
MCH RBC QN AUTO: 29.3 PG (ref 26.5–33)
MCHC RBC AUTO-ENTMCNC: 32.8 G/DL (ref 31.5–36.5)
MCV RBC AUTO: 90 FL (ref 78–100)
PLATELET # BLD AUTO: 231 10E3/UL (ref 150–450)
RBC # BLD AUTO: 4.47 10E6/UL (ref 4.4–5.9)
WBC # BLD AUTO: 5.5 10E3/UL (ref 4–11)

## 2024-05-16 PROCEDURE — 36415 COLL VENOUS BLD VENIPUNCTURE: CPT

## 2024-05-16 PROCEDURE — 80053 COMPREHEN METABOLIC PANEL: CPT

## 2024-05-16 PROCEDURE — 85652 RBC SED RATE AUTOMATED: CPT

## 2024-05-16 PROCEDURE — G2211 COMPLEX E/M VISIT ADD ON: HCPCS | Performed by: INTERNAL MEDICINE

## 2024-05-16 PROCEDURE — 86140 C-REACTIVE PROTEIN: CPT

## 2024-05-16 PROCEDURE — 85027 COMPLETE CBC AUTOMATED: CPT

## 2024-05-16 PROCEDURE — 99214 OFFICE O/P EST MOD 30 MIN: CPT | Performed by: INTERNAL MEDICINE

## 2024-05-16 NOTE — PATIENT INSTRUCTIONS
Diagnosis:  1.  Intermittent left shoulder pain: I doubt that PMR or inflammatory arthritis are the cause.  Plan continue Voltaren gel, heat, continue exercise involving the shoulder.  2.  Polymyalgia rheumatica: No symptoms of current activity.  Continue off prednisone (last dose 2022), continue hydroxychloroquine for another 3 months.  3.  Transiently elevated creatinine around the time of hip surgery: Repeat testing for kidney function.    Plan:  1.  Continue hydroxychloroquine 400 mg once daily for 3 months.  Then discontinue and monitor.  2.  Blood work for kidney function, liver function, blood counts, inflammation markers today

## 2024-05-17 LAB
ALBUMIN SERPL BCG-MCNC: 4.3 G/DL (ref 3.5–5.2)
ALP SERPL-CCNC: 94 U/L (ref 40–150)
ALT SERPL W P-5'-P-CCNC: 20 U/L (ref 0–70)
ANION GAP SERPL CALCULATED.3IONS-SCNC: 9 MMOL/L (ref 7–15)
AST SERPL W P-5'-P-CCNC: 28 U/L (ref 0–45)
BILIRUB SERPL-MCNC: 0.2 MG/DL
BUN SERPL-MCNC: 11.9 MG/DL (ref 8–23)
CALCIUM SERPL-MCNC: 9.1 MG/DL (ref 8.8–10.2)
CHLORIDE SERPL-SCNC: 99 MMOL/L (ref 98–107)
CREAT SERPL-MCNC: 1.04 MG/DL (ref 0.67–1.17)
CRP SERPL-MCNC: <3 MG/L
DEPRECATED HCO3 PLAS-SCNC: 25 MMOL/L (ref 22–29)
EGFRCR SERPLBLD CKD-EPI 2021: 74 ML/MIN/1.73M2
GLUCOSE SERPL-MCNC: 92 MG/DL (ref 70–99)
POTASSIUM SERPL-SCNC: 4.7 MMOL/L (ref 3.4–5.3)
PROT SERPL-MCNC: 6.9 G/DL (ref 6.4–8.3)
SODIUM SERPL-SCNC: 133 MMOL/L (ref 135–145)

## 2024-07-07 ENCOUNTER — HEALTH MAINTENANCE LETTER (OUTPATIENT)
Age: 79
End: 2024-07-07

## 2025-01-13 ENCOUNTER — OFFICE VISIT (OUTPATIENT)
Dept: ORTHOPEDICS | Facility: CLINIC | Age: 80
End: 2025-01-13
Payer: COMMERCIAL

## 2025-01-13 ENCOUNTER — ANCILLARY PROCEDURE (OUTPATIENT)
Dept: GENERAL RADIOLOGY | Facility: CLINIC | Age: 80
End: 2025-01-13
Attending: ORTHOPAEDIC SURGERY
Payer: COMMERCIAL

## 2025-01-13 VITALS
DIASTOLIC BLOOD PRESSURE: 69 MMHG | HEART RATE: 71 BPM | HEIGHT: 67 IN | RESPIRATION RATE: 18 BRPM | WEIGHT: 160 LBS | SYSTOLIC BLOOD PRESSURE: 113 MMHG | BODY MASS INDEX: 25.11 KG/M2

## 2025-01-13 DIAGNOSIS — Z96.642 HISTORY OF TOTAL LEFT HIP REPLACEMENT: Primary | ICD-10-CM

## 2025-01-13 DIAGNOSIS — Z96.642 HISTORY OF TOTAL LEFT HIP REPLACEMENT: ICD-10-CM

## 2025-01-13 PROCEDURE — 73502 X-RAY EXAM HIP UNI 2-3 VIEWS: CPT | Mod: TC | Performed by: RADIOLOGY

## 2025-01-13 PROCEDURE — 99214 OFFICE O/P EST MOD 30 MIN: CPT | Performed by: ORTHOPAEDIC SURGERY

## 2025-01-13 ASSESSMENT — HOOS JR: HOOS JR TOTAL INTERVAL SCORE: 100

## 2025-01-13 NOTE — LETTER
2025      Merlin J Weinhold  [address] [id number]      Dear Colleague,    Thank you for referring your patient, Merlin J Weinhold, to the [facility]. Please see a copy of my visit note below.    Merlin J Weinhold is in for follow up of left total hip arthroplasty on 24 with direct anterior approach..    He has no complaints on the hip.  He does have left shoulder pain.  He has had some chronic shoulder pain, which was exacerbated with heavy lifting recently.  He was cutting wood and had a very heavy log he tried to lift.  It is getting a little bit better..    History reviewed. No pertinent past medical history.    Past Surgical History:   Procedure Laterality Date     ARTHROPLASTY HIP ANTERIOR Left 2024    Procedure: ARTHROPLASTY, HIP, TOTAL, DIRECT ANTERIOR APPROACH LEFT;  Surgeon: Nba Dick MD;  Location:  OR       History reviewed. No pertinent family history.    Social History     Socioeconomic History     Marital status:      Spouse name: Not on file     Number of children: Not on file     Years of education: Not on file     Highest education level: Not on file   Occupational History     Not on file   Tobacco Use     Smoking status: Former     Current packs/day: 0.00     Types: Cigarettes     Quit date: 1974     Years since quittin.6     Smokeless tobacco: Never   Substance and Sexual Activity     Alcohol use: Not Currently     Drug use: Never     Sexual activity: Not on file   Other Topics Concern     Not on file   Social History Narrative     Not on file     Social Drivers of Health     Financial Resource Strain: Not on file   Food Insecurity: Not on file   Transportation Needs: Not on file   Physical Activity: Not on file   Stress: Not on file   Social Connections: Not on file   Interpersonal Safety: Not on file   Housing Stability: Not on file       Current Outpatient Medications   Medication Sig Dispense Refill      acetaminophen (TYLENOL) 325 MG tablet Take 3 tablets (975 mg) by mouth every 8 hours as needed for other (mild pain) 100 tablet 0     acetaminophen (TYLENOL) 500 MG tablet Take 500-1,000 mg by mouth every 6 hours as needed for mild pain       Cholecalciferol (VITAMIN D-3) 125 MCG (5000 UT) TABS Take 1 tablet by mouth daily       FIBER PO Melaleuca Fiberwise Take daily as directed       hydroxychloroquine (PLAQUENIL) 200 MG tablet Take 1 tablet (200 mg) by mouth 2 times daily 180 tablet 1     ibuprofen (ADVIL/MOTRIN) 200 MG capsule Take 400 mg by mouth every 6 hours as needed for moderate pain       MAGNESIUM PO Take 1 tablet by mouth daily Melaleuca       Multiple Vitamin (MULTIVITAMIN PO) Take 1 tablet by mouth 2 times daily NutriDyn Essential Minerals       Multiple Vitamin (STRESS FORMULA PO) NutriDyn Stress Essentials Adrenal B1B6 Take 1 tab with each meal       Multiple Vitamins-Minerals (IMMUNE SUPPORT PO) Take 1 tablet by mouth 2 times daily NutriDyn Immune Resilience       Nutritional Supplements (DHEA PO) Bio-Srinivas [place]. 1 spray daily       Omega-3 Fatty Acids (OMEGA 3 PO) Melaleuca Omega 3  Take one cap twice daily       PROTEIN PO Melaleuca Protein Drink, one daily       UNABLE TO FIND MEDICATION NAME: NutriDyn Testro Balance Take 1 tab daily       UNABLE TO FIND MEDICATION NAME: Malaleuca Good Zymes Take 1 with each meal       UNABLE TO FIND MEDICATION NAME: Melaleuca Prostavan 1 tab daily       UNABLE TO FIND MEDICATION NAME: Melaleuca Nutraview Take one tab daily       UNABLE TO FIND MEDICATION NAME: Melaleuca Replenex Take 1 tab twice daily       UNABLE TO FIND MEDICATION NAME: Melaleuca Multivitamin Take 1 tab twice daily       vitamin C (ASCORBIC ACID) 1000 MG TABS Take 1,000-2,000 mg by mouth in the morning.       HYDROcodone-acetaminophen (NORCO) 5-325 MG tablet Take 1 tablet by mouth in the morning and 1 tablet in the evening.       naloxone (NARCAN) 4 MG/0.1ML nasal spray Spray 4 mg in  "nostril once as needed for opioid reversal       oxyCODONE (ROXICODONE) 5 MG tablet Take 1-2 tablets (5-10 mg) by mouth every 6 hours as needed for moderate to severe pain 12 tablet 0     senna-docusate (SENOKOT-S/PERICOLACE) 8.6-50 MG tablet Take 1-2 tablets by mouth 2 times daily Take while on oral narcotics to prevent or treat constipation. 20 tablet 0       Allergies   Allergen Reactions     Fluconazole Hives, Itching and Rash     Hives like blisters without fluid arms and back and legs and chest       REVIEW OF SYSTEMS:  CONSTITUTIONAL:  NEGATIVE for fever, chills, change in weight  INTEGUMENTARY/SKIN:  NEGATIVE for worrisome rashes, moles or lesions  EYES:  NEGATIVE for vision changes or irritation  ENT/MOUTH:  NEGATIVE for ear, mouth and throat problems  RESP:  NEGATIVE for significant cough or SOB  BREAST:  NEGATIVE for masses, tenderness or discharge  CV:  NEGATIVE for chest pain, palpitations or peripheral edema  GI:  NEGATIVE for nausea, abdominal pain, heartburn, or change in bowel habits  :  Negative   MUSCULOSKELETAL:  See HPI above  NEURO:  NEGATIVE for weakness, dizziness or paresthesias  ENDOCRINE:  NEGATIVE for temperature intolerance, skin/hair changes  HEME/ALLERGY/IMMUNE:  NEGATIVE for bleeding problems  PSYCHIATRIC:  NEGATIVE for changes in mood or affect    Xray today shows good position of components.  No sign of loosening or wear.     Exam:    Vitals: /69   Pulse 71   Resp 18   Ht 1.702 m (5' 7\")   Wt 72.6 kg (160 lb)   BMI 25.06 kg/m    BMI= Body mass index is 25.06 kg/m .   Hip Range of motion:  Left external rotation 30 degrees, internal rotation 60 degrees.  No pain.  Right external rotation 30 degrees, internal rotation 60 degrees.  No pain.  Flexion full.  Extension full.  Sensation, motor, and circulation intact.  Well healed scar.  No erythema or increased warmth.  Ambulation: no abductor lurch.  His shoulder shows mild pain and weakness with resisted external rotation " and abduction.  He has full range of motion.    Assessment:  left total hip arthroplasty doing well.  Possible left rotator cuff syndrome.    Plan:  Activity as tolerated.  Continue amoxacillin 4 tablets  for dental work.  I recommend this forever.  Return to clinic 4 years with xray - low AP pelvis with lateral  left hip.    He will rest the shoulder for a couple weeks, but if pain persists, we should see back with shoulder x-ray and possible MRI.    Again, thank you for allowing me to participate in the care of your patient.        Sincerely,        Nba Dick MD    Electronically signed

## 2025-01-13 NOTE — PROGRESS NOTES
Merlin J Weinhold is in for follow up of left total hip arthroplasty on 24 with direct anterior approach..    He has no complaints on the hip.  He does have left shoulder pain.  He has had some chronic shoulder pain, which was exacerbated with heavy lifting recently.  He was cutting wood and had a very heavy log he tried to lift.  It is getting a little bit better..    History reviewed. No pertinent past medical history.    Past Surgical History:   Procedure Laterality Date    ARTHROPLASTY HIP ANTERIOR Left 2024    Procedure: ARTHROPLASTY, HIP, TOTAL, DIRECT ANTERIOR APPROACH LEFT;  Surgeon: Nba Dick MD;  Location: [facility] OR       History reviewed. No pertinent family history.    Social History     Socioeconomic History    Marital status:      Spouse name: Not on file    Number of children: Not on file    Years of education: Not on file    Highest education level: Not on file   Occupational History    Not on file   Tobacco Use    Smoking status: Former     Current packs/day: 0.00     Types: Cigarettes     Quit date: 1974     Years since quittin.6    Smokeless tobacco: Never   Substance and Sexual Activity    Alcohol use: Not Currently    Drug use: Never    Sexual activity: Not on file   Other Topics Concern    Not on file   Social History Narrative    Not on file     Social Drivers of Health     Financial Resource Strain: Not on file   Food Insecurity: Not on file   Transportation Needs: Not on file   Physical Activity: Not on file   Stress: Not on file   Social Connections: Not on file   Interpersonal Safety: Not on file   Housing Stability: Not on file       Current Outpatient Medications   Medication Sig Dispense Refill    acetaminophen (TYLENOL) 325 MG tablet Take 3 tablets (975 mg) by mouth every 8 hours as needed for other (mild pain) 100 tablet 0    acetaminophen (TYLENOL) 500 MG tablet Take 500-1,000 mg by mouth every 6 hours as needed for mild pain      Cholecalciferol  (VITAMIN D-3) 125 MCG (5000 UT) TABS Take 1 tablet by mouth daily      FIBER PO Melaleuca Fiberwise Take daily as directed      hydroxychloroquine (PLAQUENIL) 200 MG tablet Take 1 tablet (200 mg) by mouth 2 times daily 180 tablet 1    ibuprofen (ADVIL/MOTRIN) 200 MG capsule Take 400 mg by mouth every 6 hours as needed for moderate pain      MAGNESIUM PO Take 1 tablet by mouth daily Melaleuca      Multiple Vitamin (MULTIVITAMIN PO) Take 1 tablet by mouth 2 times daily NutriDyn Essential Minerals      Multiple Vitamin (STRESS FORMULA PO) NutriDyn Stress Essentials Adrenal B1B6 Take 1 tab with each meal      Multiple Vitamins-Minerals (IMMUNE SUPPORT PO) Take 1 tablet by mouth 2 times daily NutriDyn Immune Resilience      Nutritional Supplements (DHEA PO) Bio-Srinivas [place]. 1 spray daily      Omega-3 Fatty Acids (OMEGA 3 PO) Melaleuca Omega 3  Take one cap twice daily      PROTEIN PO Melaleuca Protein Drink, one daily      UNABLE TO FIND MEDICATION NAME: NutriDyn Testro Balance Take 1 tab daily      UNABLE TO FIND MEDICATION NAME: Malaleuca Good Zymes Take 1 with each meal      UNABLE TO FIND MEDICATION NAME: Melaleuca Prostavan 1 tab daily      UNABLE TO FIND MEDICATION NAME: Melaleuca Nutraview Take one tab daily      UNABLE TO FIND MEDICATION NAME: Melaleuca Replenex Take 1 tab twice daily      UNABLE TO FIND MEDICATION NAME: Melaleuca Multivitamin Take 1 tab twice daily      vitamin C (ASCORBIC ACID) 1000 MG TABS Take 1,000-2,000 mg by mouth in the morning.      HYDROcodone-acetaminophen (NORCO) 5-325 MG tablet Take 1 tablet by mouth in the morning and 1 tablet in the evening.      naloxone (NARCAN) 4 MG/0.1ML nasal spray Spray 4 mg in nostril once as needed for opioid reversal      oxyCODONE (ROXICODONE) 5 MG tablet Take 1-2 tablets (5-10 mg) by mouth every 6 hours as needed for moderate to severe pain 12 tablet 0    senna-docusate (SENOKOT-S/PERICOLACE) 8.6-50 MG tablet Take 1-2 tablets by mouth 2 times daily Take  "while on oral narcotics to prevent or treat constipation. 20 tablet 0       Allergies   Allergen Reactions    Fluconazole Hives, Itching and Rash     Hives like blisters without fluid arms and back and legs and chest       REVIEW OF SYSTEMS:  CONSTITUTIONAL:  NEGATIVE for fever, chills, change in weight  INTEGUMENTARY/SKIN:  NEGATIVE for worrisome rashes, moles or lesions  EYES:  NEGATIVE for vision changes or irritation  ENT/MOUTH:  NEGATIVE for ear, mouth and throat problems  RESP:  NEGATIVE for significant cough or SOB  BREAST:  NEGATIVE for masses, tenderness or discharge  CV:  NEGATIVE for chest pain, palpitations or peripheral edema  GI:  NEGATIVE for nausea, abdominal pain, heartburn, or change in bowel habits  :  Negative   MUSCULOSKELETAL:  See HPI above  NEURO:  NEGATIVE for weakness, dizziness or paresthesias  ENDOCRINE:  NEGATIVE for temperature intolerance, skin/hair changes  HEME/ALLERGY/IMMUNE:  NEGATIVE for bleeding problems  PSYCHIATRIC:  NEGATIVE for changes in mood or affect    Xray today shows good position of components.  No sign of loosening or wear.     Exam:    Vitals: /69   Pulse 71   Resp 18   Ht 1.702 m (5' 7\")   Wt 72.6 kg (160 lb)   BMI 25.06 kg/m    BMI= Body mass index is 25.06 kg/m .   Hip Range of motion:  Left external rotation 30 degrees, internal rotation 60 degrees.  No pain.  Right external rotation 30 degrees, internal rotation 60 degrees.  No pain.  Flexion full.  Extension full.  Sensation, motor, and circulation intact.  Well healed scar.  No erythema or increased warmth.  Ambulation: no abductor lurch.  His shoulder shows mild pain and weakness with resisted external rotation and abduction.  He has full range of motion.    Assessment:  left total hip arthroplasty doing well.  Possible left rotator cuff syndrome.    Plan:  Activity as tolerated.  Continue amoxacillin 4 tablets  for dental work.  I recommend this forever.  Return to clinic 4 years with xray - low " AP pelvis with lateral  left hip.    He will rest the shoulder for a couple weeks, but if pain persists, we should see back with shoulder x-ray and possible MRI.

## 2025-02-20 ENCOUNTER — OFFICE VISIT (OUTPATIENT)
Dept: RHEUMATOLOGY | Facility: CLINIC | Age: 80
End: 2025-02-20
Payer: COMMERCIAL

## 2025-02-20 VITALS
DIASTOLIC BLOOD PRESSURE: 74 MMHG | HEART RATE: 64 BPM | WEIGHT: 174.5 LBS | BODY MASS INDEX: 27.33 KG/M2 | SYSTOLIC BLOOD PRESSURE: 120 MMHG

## 2025-02-20 DIAGNOSIS — M35.3 PMR (POLYMYALGIA RHEUMATICA): Primary | ICD-10-CM

## 2025-02-20 ASSESSMENT — PAIN SCALES - GENERAL: PAINLEVEL_OUTOF10: MILD PAIN (2)

## 2025-02-20 NOTE — PROGRESS NOTES
Outpatient Rheumatology follow-up    Name: Merlin Weinhold    MRN [id number]   Today's date: 02/20/2025           Reason for follow-up: PMR   Primary care physician: Cristy Connolly MD             Assessment & Plan:     # PMR (onset , shoulder/hip girdle pain): He reports activity associated left shoulder pain, improved with chiropractic he and range of motion exercises in the last several months.  Chronic low-grade shoulder blade myalgia is unchanged compared with last visit.  Exam shows point tenderness anterior L shoulder; impingement signs are negative; there is painless full range of motion of the left shoulder.    In May 2024, comprehensive metabolic panel was normal; CBC was normal and sed rate was 11.    # Left hip pain : Improved, status post left total hip replacement January 2024    #Chronic cervical/thoracic back pain: Pain is low-grade, manageable with symptomatic treatment.    # Positive GEORGINA:  No symptoms or signs currently referable to GEORGINA associated disorder today.  Prior serologies from show an GEORGINA of 1:1280 in a homogeneous pattern. SSA, SSB, smith, RNP, dsDNA, C4 are all negative/normal.    Discussion: Patient has had no symptoms directly referable to polymyalgia rheumatica since discontinuation of prednisone in late 2022.  Hydroxychloroquine has been off since 12-24. polymyalgia rheumatica has been in remission since late 2022.  Chance of recurrence at this time is negligible.  There is no sign or symptom of GEORGINA associated disease.  I advised patient to report to rheumatology if symptoms of polymyalgia rheumatica occur, or if cranial symptoms (headache, visual changes) occur.    We discussed:    Diagnosis:  1.  Intermittent left shoulder pain: Likely bursitis/tendonitis is the cause. I doubt that PMR or inflammatory arthritis are contributing.  Plan continue Voltaren gel, heat, continue exercise involving the left shoulder.  2.  Polymyalgia rheumatica: No symptoms of current  activity.  3.  Positive GEORGINA, isolated, without autoimmune disease.    Plan:  1. Blood work for kidney function, liver function, blood counts, inflammation markers today.  2. Alert Rheumatology for recurrent PMR symptoms  3. Continue off prednisone (last dose 2022), continue off hydroxychloroquine (last dose ).    RTC prn    Ethan Alicia MD  Staff Rheumatologist, [facility]    On the day of the encounter, a total of 30 minutes was spent in chart review, and in counseling and coordination of care, regarding the patient's complex medical problem of polymyalgia rheumatica, left shoulder pain, osteoarthritis, left hip    The longitudinal plan of care for the diagnosis(es)/condition(s) as documented were addressed during this visit. Due to the added complexity in care, I will continue to support Merlin in the subsequent management and with ongoing continuity of care.    No orders of the defined types were placed in this encounter.         Subjective:     Patient presents for followup of PMR and hip pain. He was last seen in May 2024.  L trochanteric pain symdrome and quiescent PMR were noted. Hydroxychloroquine was continued for another 3 months, with recommendation to discontinue the drug in fall 2024.    Interval history February 20, 2025    He reports an episode of L shoulder pain after a wood-cutting event last Fall. He saw Orthopedics, and concern was raised for rotator cuff tear. Since then he has noted only activity associated pain. He has been seeing chiropractor with ultrasound treatment and adjustment.    Still notes some light discomfort between the shoulder blades that he attributes to PMR. Hips and low back are fine. Uses vin board for 4 minutes daily.  Energy level is good, after he recovered from head cold in January.  No early morning stiffness;  L hip is much better after surgery.    No HA, vision changes, F/C, jaw/tongue pain.    He ran out of hydroxychloroquine in .       Interval  "history May 16, 2024    He notes ongoing L shoulder pain, with tenderness at the anterior shoulder.  He uses mallaleuca and voltaren with some help; chiropracty gives temporary relief.  Also notes neck pain which is less intense than before.  Spouse reports that aptient is much more active than when PMR was active. He tires more easitly, but is able to garden and be physically active much more.  No significant morning stiffness. Uses \"pain creams\" more in evening to help with sleep.    L hip is much better since 1-2024 L THR.  He is able to walk a block, but still has pain along side the hip.    Occasionally HA, pain goes away with a couple aspirin.  No recent visual changes.      10/05/23 hx Dr. Arreguin    Pt has been doing well overall. His biggest concern today is left hip pain. He was diagnosed with moderate left hip OA in 2021. An initial intraarticular corticosteroid injection years ago provided significant relief. The most recent injection in 4/2023 provided minimal relief. He notes that the pain, localized on the lateral aspect of the left hip, has worsened in the last few weeks. He is following with physical therapy and has had one visit so far.    Otherwise, he has had not PMR flares since our last visit. Upper back pain has remained unchanged. He localizes the pain just inferior to bilateral scapula. This pain usually worsens the day after strenuous activity such as doing yard work. He is able to perform all ADLs without limitation. Treats these symptoms with over the counter topical analgesics. No proximal muscle weakness. No other joint pain, stiffness, or swelling. No morning stiffness. Denies headache, vision changes, or jaw pain. No fevers, chills or weight loss. Continues to have cold intolerance, especially in the winter. He created a device in his basement to divert the heat from fireplace and chimney in the rest of the room to keep him warm. Denies hands changing colors, although wife notes " that they can be purple at times.    14 point review of systems collected and negative if not documented above.      Interval History 2/1/2023:  Pt reports he has been doing well since his last visit. Tapered completely off steroids around September of 2022 (unsure of exact date), and perhaps noted a slight increase in upper back pain but otherwise feels symptoms have been controlled since last visit. No issues with ROM, strength in the shoulders, hips like before, tolerating ADLs well. No morning stiffness. No scalp tenderness, headaches, changes in vision, fevers, chills, night sweats, joint pain, oral lesions. No GI symptoms. Has been tolerating Plaquenil well, no changes in vision and had eye exam with [facility] in December. Biggest concern today is cold intolerance, worse in the hands, but also more generalized in the rest of the body. Pt reports hands feel constantly cold, which is exacerbated by cold exposure when outside. No numbness, paresthesias. No color changes to the digits.     Interval history 8/11/2022   has pain in the upper back in the afternoons, between 3-4pm. Lasts for the rest of the day. Takes vicodin in the afternoon another 8pm. In the AM it is gone. His current dose of steroids is 1 tablet of methylprednisolone 4mg each morning. Has been on this dose for about 10 weeks. Shortly after decreasing to that dose, he noticed that his shoulder symptoms were some worse.  Though not by much. No side effects from HCQ. No GI complaints any longer. Up about 16 pounds. No GCA symptoms. Occasional hip discomfort after a short walk. Able to get up out of a chair without any difficulty. Shoulder ROM is much improved. Overall he is functionally improved from his PMR treatment. He and his wife are pleased with him improvement during this time. No interval infections. no fevers chills night sweats.  No new rashes.  14 point review of systems collected and otherwise negative.    HPI from initial  "consultation 4/11/2022 Jan/Feb, both shoulders became painful. Could lift his arms above his shoulders. Went to PT for 4 weeks, twice weekly and his ROM inproved, though pain did not resolve. He went back to PT for another 2-3 weeks. And pain still did not resolve. And plan was to continue PT at home and was doing this daily 5x/week. He then had left hip pain as well, and so had injection into left hip then bilateral shoulders as well. Though this only lasted for days to weeks. So he had second injection into right shoulder, which again, not much helpful. Then in June, had hyponatremia and was admitted to [facility]. At the end of august was started on prednisone with taper. Medrol dose pack. Did this ultimately 3 times. He had noticeable improvement with each of these. Then due to this improvement he was started on 8mg of medrol. Had noted small/mild stomach pain starting in the summer of 2021. Had small glass of wine in nov which was some worse. Then in January this started to escalate. In Junuary he had also developed significant hives (previously seen in sept when on fluconazole). He had much worsening of stomach pain during this episode of hives. Small amount of eating or tums \"took the edge off\" of his stomach pain. Appetite was much reduced during this period. Pain was so severe that evening that he got up to take advil and had a fall (hit right shoulder/head). Was seen by PCP/given pepcid. Had CT head without bleed.     In June, was taking 6-8 advil per day. From June to Feb was taking 6-8 advil per day. This was with his steroid that was started in august.      Duodenal Ulcer \"benign appearing, but it is a bit unual in that it is completely circumferential with only 2mm of witdt and mild associated narrowing of the lumen\".     Has lost about 55 pounds from Jan 2021 to today. 10 with COVID. 10 with cleanse with natropath. It was on day 8 of this that he ended up in the hospital due to hyponatremia. "     No HA. No vision change. No scalp tenderness. No jaw claudication symptoms. No dry cough. Has chills. No fevers/ night sweats. No epistaxis/hemoptysis. Has had canker sores on and off for years (seconary to peanuts). No blood in stool. No chest pain/shortness of breath. Abdominal symptoms above. No red/hot/swollen joints. No new rash. Has ulcers from pressure sores. Slowly healing. These are improving slowly. No symptoms consistent with dactylitis. No symptoms consistent with sclerodactyy.     Past Medical History  PVD  Hearing loss  Osteoarthritis in hands    Past Surgical History  Appendix  Cyst removal    Medications  Current Outpatient Medications   Medication Sig Dispense Refill    Cholecalciferol (VITAMIN D-3) 125 MCG (5000 UT) TABS Take 1 tablet by mouth daily      FIBER PO Melaleuca Fiberwise Take daily as directed      MAGNESIUM PO Take 1 tablet by mouth daily Melaleuca      Multiple Vitamin (MULTIVITAMIN PO) Take 1 tablet by mouth 2 times daily NutriDyn Essential Minerals      Omega-3 Fatty Acids (OMEGA 3 PO) Melaleuca Omega 3  Take one cap twice daily      PROTEIN PO Melaleuca Protein Drink, one daily      UNABLE TO FIND MEDICATION NAME: Malaleuca Good Zymes Take 1 with each meal      UNABLE TO FIND MEDICATION NAME: Melaleuca Prostavan 1 tab daily      UNABLE TO FIND MEDICATION NAME: Melaleuca Nutraview Take one tab daily      UNABLE TO FIND MEDICATION NAME: Melaleuca Replenex Take 1 tab twice daily      UNABLE TO FIND MEDICATION NAME: Melaleuca Multivitamin Take 1 tab twice daily      vitamin C (ASCORBIC ACID) 1000 MG TABS Take 1,000-2,000 mg by mouth in the morning.      acetaminophen (TYLENOL) 325 MG tablet Take 3 tablets (975 mg) by mouth every 8 hours as needed for other (mild pain) 100 tablet 0    acetaminophen (TYLENOL) 500 MG tablet Take 500-1,000 mg by mouth every 6 hours as needed for mild pain      HYDROcodone-acetaminophen (NORCO) 5-325 MG tablet Take 1 tablet by mouth in the morning and 1  tablet in the evening.      hydroxychloroquine (PLAQUENIL) 200 MG tablet Take 1 tablet (200 mg) by mouth 2 times daily 180 tablet 1    ibuprofen (ADVIL/MOTRIN) 200 MG capsule Take 400 mg by mouth every 6 hours as needed for moderate pain      Multiple Vitamin (STRESS FORMULA PO) NutriDyn Stress Essentials Adrenal B1B6 Take 1 tab with each meal (Patient not taking: Reported on 2/20/2025)      Multiple Vitamins-Minerals (IMMUNE SUPPORT PO) Take 1 tablet by mouth 2 times daily NutriDyn Immune Resilience (Patient not taking: Reported on 2/20/2025)      naloxone (NARCAN) 4 MG/0.1ML nasal spray Spray 4 mg in nostril once as needed for opioid reversal      Nutritional Supplements (DHEA PO) Bio-Srinivas [place]. 1 spray daily (Patient not taking: Reported on 2/20/2025)      oxyCODONE (ROXICODONE) 5 MG tablet Take 1-2 tablets (5-10 mg) by mouth every 6 hours as needed for moderate to severe pain 12 tablet 0    senna-docusate (SENOKOT-S/PERICOLACE) 8.6-50 MG tablet Take 1-2 tablets by mouth 2 times daily Take while on oral narcotics to prevent or treat constipation. 20 tablet 0    UNABLE TO FIND MEDICATION NAME: NutriDyn Testro Balance Take 1 tab daily (Patient not taking: Reported on 2/20/2025)       No current facility-administered medications for this visit.     Allergies     Allergies   Allergen Reactions    Fluconazole Hives, Itching and Rash     Hives like blisters without fluid arms and back and legs and chest       Family History  Strong family history of CA (lung or breast in 4 of 6 of his immediate family)    Social History  Prior smoker, quit 49. No ETOH since nov. No hx of drug use now or in the past.      Objective:   /74 (BP Location: Left arm, Patient Position: Sitting, Cuff Size: Adult Large)   Pulse 64   Wt 79.2 kg (174 lb 8 oz)   BMI 27.33 kg/m    Blood pressure 120/74, pulse 64, weight 79.2 kg (174 lb 8 oz).  Wt Readings from Last 4 Encounters:   02/20/25 79.2 kg (174 lb 8 oz)   01/13/25 72.6 kg (160 lb)    05/16/24 73.3 kg (161 lb 8 oz)   05/06/24 72.6 kg (160 lb)     Body mass index is 27.33 kg/m .   Physical exam:  GEN: sitting up unassisted, NAD, wife present with him  HEENT: No facial rash, sclera clear, no oral or nasal ulcers, no inflammatory nasal bridge/external ear changes  Abdomen: soft, non tender, not distended, no masses  Extremities: full active and passive ROM of bilateral shoulders/elbows, wrists. Can make full fist bilaterally, 5/5  strength. Knees/ankles/MTPs unremarkable. No synovitis of these joints. No dactylitis. No digital pitting. No nail changes. No sclerodactyly. Able to stand unassisted without the use of his arms from a seated position.  No shoulder tenderness; full neck ROM.    Labs:  WBC Count   Date Value Ref Range Status   05/16/2024 5.5 4.0 - 11.0 10e3/uL Final     Hemoglobin   Date Value Ref Range Status   05/16/2024 13.1 (L) 13.3 - 17.7 g/dL Final     Platelet Count   Date Value Ref Range Status   05/16/2024 231 150 - 450 10e3/uL Final     Creatinine   Date Value Ref Range Status   05/16/2024 1.04 0.67 - 1.17 mg/dL Final     Lab Results   Component Value Date    ALKPHOS 65 08/11/2022     AST   Date Value Ref Range Status   05/16/2024 28 0 - 45 U/L Final     Comment:     Reference intervals for this test were updated on 6/12/2023 to more accurately reflect our healthy population. There may be differences in the flagging of prior results with similar values performed with this method. Interpretation of those prior results can be made in the context of the updated reference intervals.     Lab Results   Component Value Date    ALT 20 08/11/2022     Erythrocyte Sedimentation Rate   Date Value Ref Range Status   05/16/2024 11 0 - 20 mm/hr Final     CRP Inflammation   Date Value Ref Range Status   08/11/2022 <2.9 0.0 - 8.0 mg/L Final     UA RESULTS:  Recent Labs   Lab Test 04/12/22  1115   COLOR Yellow   APPEARANCE Clear   URINEGLC Negative   URINEBILI Negative   URINEKETONE Negative    SG 1.023   UBLD Negative   URINEPH 5.0   PROTEIN Negative   NITRITE Negative   LEUKEST Negative   RBCU 1   WBCU 1          GEORGINA pattern 1   Date Value Ref Range Status   04/12/2022 Homogeneous  Final     DNA (ds) Antibody   Date Value Ref Range Status   04/12/2022 9.7 <10.0 IU/mL Final     Comment:     Negative     RNP Antibody IgG   Date Value Ref Range Status   04/12/2022 Negative Negative Final     3/25/2022  Serum electrophoresis no paraprotein identified    3/22/2022  Ferritin 563  Creatinine 0.62  AST 24  ALT 20  CRP 4  ESR 21    3/12/2022  WBC 4.3  Hemoglobin 9.5  Platelet 264    2/23/2022  Double-stranded DNA 38  SSA 1.3  Antichromatin antibody greater than 8  RNP negative  [place]on[place] negative  SCL 70 -  SSB negative  Billie 1 -  Anticentromere negative  Hep C antibody negative    8/25/2021  Lyme IgG and IgM negative  CK normal at 40  CCP antibody negative    Imaging:  EXAM: CT CHEST WITH CONTRAST; CT ABDOMEN AND PELVIS WITH   CONTRAST     CLINICAL INFORMATION: Weight loss, abdominal pain     TECHNICAL INFORMATION:  The patient was given 3 cups of   water containing a total of 50 mL Omnipaque 300 to drink   prior to the exam.  After IV injection of 100 mL of   Omnipaque 300, axial sections were obtained from the   thoracic inlet through the symphysis pubis.  Reformations   were obtained in the coronal and sagittal planes.  No   immediate complications were seen.       COMPARISON: Ultrasound 8/26/2021     INTERPRETATION:     Chest:     The lungs are clear.  No suspicious pulmonary nodules or   masses.     Heart size is normal; aortic and coronary atherosclerosis   noted.  No pericardial or pleural effusion.     No intrathoracic lymphadenopathy by size criteria.     Abdomen:     The liver, bile ducts, pancreas, spleen, adrenal glands, and   kidneys are within normal limits.  There is cholelithiasis   without evidence of cholecystitis.  Visualized intestines   are unremarkable.     No abdominopelvic lymphadenopathy by  size criteria.   Negative for ascites.  There is atherosclerosis of the aorta   and its branches without aneurysm.     Pelvis:     The prostate measures 4.1 x 5.0 cm.     Urinary bladder is partially collapsed but grossly normal.     Musculoskeletal:     No acute or suspicious bony abnormality.     CONCLUSION:     1. No CT findings to explain the patient's symptoms.   2. Prostatic enlargement.

## 2025-02-20 NOTE — NURSING NOTE
Chief Complaint   Patient presents with    RECHECK     PMR (polymyalgia rheumatica)       Vitals:    02/20/25 1030   BP: 120/74   BP Location: Left arm   Patient Position: Sitting   Cuff Size: Adult Large   Pulse: 64   Weight: 79.2 kg (174 lb 8 oz)       Body mass index is 27.33 kg/m .    Aby Mcpherson, [facility]

## 2025-02-20 NOTE — PATIENT INSTRUCTIONS
Diagnosis:  1.  Intermittent left shoulder pain: Likely bursitis/tendonitis is the cause. I doubt that PMR or inflammatory arthritis are contributing.  Plan continue Voltaren gel, heat, continue exercise involving the left shoulder.  2.  Polymyalgia rheumatica: No symptoms of current activity.      Plan:  1. Blood work for kidney function, liver function, blood counts, inflammation markers today.  2. Alert Rheumatology for recurrent PMR symptoms  3. Continue off prednisone (last dose 2022), continue off hydroxychloroquine (last dose ).

## 2025-03-05 ENCOUNTER — TRANSFERRED RECORDS (OUTPATIENT)
Dept: HEALTH INFORMATION MANAGEMENT | Facility: CLINIC | Age: 80
End: 2025-03-05
Payer: COMMERCIAL

## 2025-03-05 LAB
CREATININE (EXTERNAL): 0.99 MG/DL (ref 0.76–1.27)
GFR ESTIMATED (EXTERNAL): 78 ML/MIN/1.73

## (undated) DEVICE — DRAPE POUCH IRR 1016

## (undated) DEVICE — SOL WATER IRRIG 1000ML BOTTLE 07139-09

## (undated) DEVICE — DRAPE C-ARM PACK 07PK803

## (undated) DEVICE — SOL NACL 0.9% IRRIG 3000ML BAG 07972-08

## (undated) DEVICE — DRAPE IOBAN INCISE 36X23" 6651EZ

## (undated) DEVICE — GLOVE BIOGEL PI INDICATOR 8.0 LF 41680

## (undated) DEVICE — SU ETHIBOND 2 V-37 4X30" MX69G

## (undated) DEVICE — SU DERMABOND ADVANCED .7ML DNX12

## (undated) DEVICE — PACK TOTAL JOINT STD LATEX

## (undated) DEVICE — BLADE SAW SAGITTAL STRK 25X90X1.19MM HD SYS 6 6125-119-090

## (undated) DEVICE — HOOD FLYTE 0408-800-000

## (undated) DEVICE — BLADE KNIFE SURG 10 371110

## (undated) DEVICE — SU VICRYL 0 CT-1 36" J946H

## (undated) DEVICE — GLOVE BIOGEL PI SZ 7.5 40875

## (undated) DEVICE — GLOVE BIOGEL INDICATOR 7.5 LF 41675

## (undated) DEVICE — ESU BIPOLAR SEALER AQUAMANTYS 6MM 23-112-1

## (undated) DEVICE — KIT PATIENT CARE HANA TABLE PROFX SUPINE 6855

## (undated) DEVICE — PREP CHLORAPREP 26ML TINTED ORANGE  260815

## (undated) DEVICE — SU MONOCRYL 3-0 PS-2 18" UND Y497G

## (undated) DEVICE — SUTURE VICRYL+ 2 27IN CP UNDYED VCP195H

## (undated) DEVICE — SUCTION IRR SYSTEM W/O TIP INTERPULSE HANDPIECE 0210-100-000

## (undated) DEVICE — DRILL BIT BIOM QUICK CONNECTING RING LOCK 3.2X30MM 31-323230

## (undated) DEVICE — BONE CLEANING TIP INTERPULSE  0210-010-000

## (undated) DEVICE — ESU PENCIL SMOKE EVAC W/ROCKER SWITCH 0703-047-000

## (undated) DEVICE — SU VICRYL 2-0 CT-2 27" UND J269H

## (undated) DEVICE — DRSG AQUACEL AG HYDROFIBER  3.5X10" 422605

## (undated) DEVICE — DRAPE STERI U 1015

## (undated) DEVICE — DRAPE POUCH INSTRUMENT 1018

## (undated) DEVICE — DRAPE U SPLIT 74X120" 29440

## (undated) RX ORDER — LIDOCAINE HYDROCHLORIDE 10 MG/ML
INJECTION, SOLUTION EPIDURAL; INFILTRATION; INTRACAUDAL; PERINEURAL
Status: DISPENSED
Start: 2024-01-24

## (undated) RX ORDER — ONDANSETRON 2 MG/ML
INJECTION INTRAMUSCULAR; INTRAVENOUS
Status: DISPENSED
Start: 2024-01-24

## (undated) RX ORDER — HYDROMORPHONE HYDROCHLORIDE 1 MG/ML
INJECTION, SOLUTION INTRAMUSCULAR; INTRAVENOUS; SUBCUTANEOUS
Status: DISPENSED
Start: 2024-01-24

## (undated) RX ORDER — FENTANYL CITRATE 50 UG/ML
INJECTION, SOLUTION INTRAMUSCULAR; INTRAVENOUS
Status: DISPENSED
Start: 2024-01-24

## (undated) RX ORDER — PROPOFOL 10 MG/ML
INJECTION, EMULSION INTRAVENOUS
Status: DISPENSED
Start: 2024-01-24

## (undated) RX ORDER — PHENYLEPHRINE HYDROCHLORIDE 10 MG/ML
INJECTION INTRAVENOUS
Status: DISPENSED
Start: 2024-01-24

## (undated) RX ORDER — VANCOMYCIN HYDROCHLORIDE 1 G/20ML
INJECTION, POWDER, LYOPHILIZED, FOR SOLUTION INTRAVENOUS
Status: DISPENSED
Start: 2024-01-24

## (undated) RX ORDER — SODIUM CHLORIDE 9 MG/ML
INJECTION, SOLUTION INTRAVENOUS
Status: DISPENSED
Start: 2024-01-24

## (undated) RX ORDER — FENTANYL CITRATE-0.9 % NACL/PF 10 MCG/ML
PLASTIC BAG, INJECTION (ML) INTRAVENOUS
Status: DISPENSED
Start: 2024-01-24

## (undated) RX ORDER — DEXAMETHASONE SODIUM PHOSPHATE 10 MG/ML
INJECTION, SOLUTION INTRAMUSCULAR; INTRAVENOUS
Status: DISPENSED
Start: 2024-01-24